# Patient Record
Sex: FEMALE | Race: WHITE | NOT HISPANIC OR LATINO
[De-identification: names, ages, dates, MRNs, and addresses within clinical notes are randomized per-mention and may not be internally consistent; named-entity substitution may affect disease eponyms.]

---

## 2017-06-13 ENCOUNTER — APPOINTMENT (OUTPATIENT)
Dept: MAMMOGRAPHY | Facility: HOSPITAL | Age: 72
End: 2017-06-13

## 2017-06-13 ENCOUNTER — OUTPATIENT (OUTPATIENT)
Dept: OUTPATIENT SERVICES | Facility: HOSPITAL | Age: 72
LOS: 1 days | End: 2017-06-13
Payer: MEDICARE

## 2017-06-13 DIAGNOSIS — Z98.89 OTHER SPECIFIED POSTPROCEDURAL STATES: Chronic | ICD-10-CM

## 2017-06-13 DIAGNOSIS — Z90.710 ACQUIRED ABSENCE OF BOTH CERVIX AND UTERUS: Chronic | ICD-10-CM

## 2017-06-13 PROCEDURE — 77063 BREAST TOMOSYNTHESIS BI: CPT

## 2017-06-13 PROCEDURE — G0202: CPT | Mod: 26

## 2017-06-13 PROCEDURE — 77067 SCR MAMMO BI INCL CAD: CPT

## 2017-06-13 PROCEDURE — 77063 BREAST TOMOSYNTHESIS BI: CPT | Mod: 26

## 2017-07-27 ENCOUNTER — EMERGENCY (EMERGENCY)
Facility: HOSPITAL | Age: 72
LOS: 1 days | End: 2017-07-27
Attending: EMERGENCY MEDICINE | Admitting: EMERGENCY MEDICINE
Payer: MEDICARE

## 2017-07-27 VITALS
SYSTOLIC BLOOD PRESSURE: 112 MMHG | HEART RATE: 75 BPM | RESPIRATION RATE: 16 BRPM | DIASTOLIC BLOOD PRESSURE: 68 MMHG | TEMPERATURE: 98 F | OXYGEN SATURATION: 98 %

## 2017-07-27 VITALS
RESPIRATION RATE: 20 BRPM | DIASTOLIC BLOOD PRESSURE: 62 MMHG | HEART RATE: 71 BPM | WEIGHT: 179.9 LBS | TEMPERATURE: 98 F | HEIGHT: 67 IN | SYSTOLIC BLOOD PRESSURE: 118 MMHG | OXYGEN SATURATION: 100 %

## 2017-07-27 DIAGNOSIS — Z98.89 OTHER SPECIFIED POSTPROCEDURAL STATES: Chronic | ICD-10-CM

## 2017-07-27 DIAGNOSIS — Z90.710 ACQUIRED ABSENCE OF BOTH CERVIX AND UTERUS: Chronic | ICD-10-CM

## 2017-07-27 PROCEDURE — 73502 X-RAY EXAM HIP UNI 2-3 VIEWS: CPT | Mod: 26,RT

## 2017-07-27 PROCEDURE — 99284 EMERGENCY DEPT VISIT MOD MDM: CPT

## 2017-07-27 PROCEDURE — 70450 CT HEAD/BRAIN W/O DYE: CPT

## 2017-07-27 PROCEDURE — 73502 X-RAY EXAM HIP UNI 2-3 VIEWS: CPT

## 2017-07-27 PROCEDURE — 70450 CT HEAD/BRAIN W/O DYE: CPT | Mod: 26

## 2017-07-27 PROCEDURE — 73562 X-RAY EXAM OF KNEE 3: CPT | Mod: 26,LT

## 2017-07-27 PROCEDURE — 72170 X-RAY EXAM OF PELVIS: CPT | Mod: 26,59

## 2017-07-27 PROCEDURE — 72170 X-RAY EXAM OF PELVIS: CPT

## 2017-07-27 PROCEDURE — 99284 EMERGENCY DEPT VISIT MOD MDM: CPT | Mod: 25

## 2017-07-27 PROCEDURE — 73562 X-RAY EXAM OF KNEE 3: CPT

## 2017-07-27 NOTE — ED ADULT NURSE NOTE - OBJECTIVE STATEMENT
71YO female BIBA, pt c/o right hip pain secondary to a slip and fall. "I was trying to take a shower when I fell". pt denies and any neck/back pain.

## 2017-07-27 NOTE — ED PROVIDER NOTE - CONSTITUTIONAL, MLM
normal... Well appearing, well nourished, awake, alert, oriented to person, place, time/situation and in no apparent distress. Appears tired

## 2017-07-27 NOTE — ED PROVIDER NOTE - OBJECTIVE STATEMENT
Patient reportedly fell getting into her shower. C/o some right hip pain. Denies other injury. No head pain, no neck pain.    Patient admits to drinking alcohol tonight

## 2017-07-27 NOTE — ED PROVIDER NOTE - PMH
Basal cell cancer  skin  Diabetes mellitus    Elevated cholesterol    Hypertension    OA (osteoarthritis)    Scoliosis

## 2017-07-27 NOTE — ED PROVIDER NOTE - CHPI ED SYMPTOMS NEG
no loss of consciousness/no weakness/no fever/no vomiting/no confusion/no abrasion/no deformity/no bleeding

## 2017-08-28 ENCOUNTER — INPATIENT (INPATIENT)
Facility: HOSPITAL | Age: 72
LOS: 9 days | Discharge: HOME | DRG: 872 | End: 2017-09-07
Attending: FAMILY MEDICINE | Admitting: FAMILY MEDICINE
Payer: MEDICARE

## 2017-08-28 VITALS
OXYGEN SATURATION: 98 % | HEART RATE: 130 BPM | SYSTOLIC BLOOD PRESSURE: 185 MMHG | TEMPERATURE: 98 F | DIASTOLIC BLOOD PRESSURE: 98 MMHG | RESPIRATION RATE: 18 BRPM

## 2017-08-28 DIAGNOSIS — L03.90 CELLULITIS, UNSPECIFIED: ICD-10-CM

## 2017-08-28 DIAGNOSIS — R00.0 TACHYCARDIA, UNSPECIFIED: ICD-10-CM

## 2017-08-28 DIAGNOSIS — Z90.710 ACQUIRED ABSENCE OF BOTH CERVIX AND UTERUS: Chronic | ICD-10-CM

## 2017-08-28 DIAGNOSIS — I10 ESSENTIAL (PRIMARY) HYPERTENSION: ICD-10-CM

## 2017-08-28 DIAGNOSIS — E78.00 PURE HYPERCHOLESTEROLEMIA, UNSPECIFIED: ICD-10-CM

## 2017-08-28 DIAGNOSIS — M54.9 DORSALGIA, UNSPECIFIED: ICD-10-CM

## 2017-08-28 DIAGNOSIS — Z98.89 OTHER SPECIFIED POSTPROCEDURAL STATES: Chronic | ICD-10-CM

## 2017-08-28 DIAGNOSIS — E11.9 TYPE 2 DIABETES MELLITUS WITHOUT COMPLICATIONS: ICD-10-CM

## 2017-08-28 LAB
ALBUMIN SERPL ELPH-MCNC: 3 G/DL — LOW (ref 3.3–5)
ALP SERPL-CCNC: 83 U/L — SIGNIFICANT CHANGE UP (ref 40–120)
ALT FLD-CCNC: 40 U/L — SIGNIFICANT CHANGE UP (ref 12–78)
ANION GAP SERPL CALC-SCNC: 10 MMOL/L — SIGNIFICANT CHANGE UP (ref 5–17)
APPEARANCE UR: CLEAR — SIGNIFICANT CHANGE UP
AST SERPL-CCNC: 21 U/L — SIGNIFICANT CHANGE UP (ref 15–37)
BASOPHILS # BLD AUTO: 0 K/UL — SIGNIFICANT CHANGE UP (ref 0–0.2)
BASOPHILS NFR BLD AUTO: 0.2 % — SIGNIFICANT CHANGE UP (ref 0–2)
BILIRUB SERPL-MCNC: 0.4 MG/DL — SIGNIFICANT CHANGE UP (ref 0.2–1.2)
BILIRUB UR-MCNC: NEGATIVE — SIGNIFICANT CHANGE UP
BUN SERPL-MCNC: 22 MG/DL — SIGNIFICANT CHANGE UP (ref 7–23)
CALCIUM SERPL-MCNC: 9.5 MG/DL — SIGNIFICANT CHANGE UP (ref 8.5–10.1)
CHLORIDE SERPL-SCNC: 97 MMOL/L — SIGNIFICANT CHANGE UP (ref 96–108)
CO2 SERPL-SCNC: 25 MMOL/L — SIGNIFICANT CHANGE UP (ref 22–31)
COLOR SPEC: YELLOW — SIGNIFICANT CHANGE UP
CREAT SERPL-MCNC: 0.73 MG/DL — SIGNIFICANT CHANGE UP (ref 0.5–1.3)
DIFF PNL FLD: NEGATIVE — SIGNIFICANT CHANGE UP
EOSINOPHIL # BLD AUTO: 0 K/UL — SIGNIFICANT CHANGE UP (ref 0–0.5)
EOSINOPHIL NFR BLD AUTO: 0 % — SIGNIFICANT CHANGE UP (ref 0–6)
GLUCOSE SERPL-MCNC: 145 MG/DL — HIGH (ref 70–99)
GLUCOSE UR QL: NEGATIVE — SIGNIFICANT CHANGE UP
HCT VFR BLD CALC: 35.4 % — SIGNIFICANT CHANGE UP (ref 34.5–45)
HGB BLD-MCNC: 12.1 G/DL — SIGNIFICANT CHANGE UP (ref 11.5–15.5)
KETONES UR-MCNC: NEGATIVE — SIGNIFICANT CHANGE UP
LACTATE SERPL-SCNC: 1.4 MMOL/L — SIGNIFICANT CHANGE UP (ref 0.7–2)
LEUKOCYTE ESTERASE UR-ACNC: NEGATIVE — SIGNIFICANT CHANGE UP
LYMPHOCYTES # BLD AUTO: 0.8 K/UL — LOW (ref 1–3.3)
LYMPHOCYTES # BLD AUTO: 4.3 % — LOW (ref 13–44)
MCHC RBC-ENTMCNC: 30.8 PG — SIGNIFICANT CHANGE UP (ref 27–34)
MCHC RBC-ENTMCNC: 34.2 GM/DL — SIGNIFICANT CHANGE UP (ref 32–36)
MCV RBC AUTO: 90.1 FL — SIGNIFICANT CHANGE UP (ref 80–100)
MONOCYTES # BLD AUTO: 1.5 K/UL — HIGH (ref 0–0.9)
MONOCYTES NFR BLD AUTO: 7.7 % — SIGNIFICANT CHANGE UP (ref 1–9)
NEUTROPHILS # BLD AUTO: 16.9 K/UL — HIGH (ref 1.8–7.4)
NEUTROPHILS NFR BLD AUTO: 87.8 % — HIGH (ref 43–77)
NITRITE UR-MCNC: NEGATIVE — SIGNIFICANT CHANGE UP
PH UR: 5 — SIGNIFICANT CHANGE UP (ref 5–8)
PLATELET # BLD AUTO: 400 K/UL — SIGNIFICANT CHANGE UP (ref 150–400)
POTASSIUM SERPL-MCNC: 4 MMOL/L — SIGNIFICANT CHANGE UP (ref 3.5–5.3)
POTASSIUM SERPL-SCNC: 4 MMOL/L — SIGNIFICANT CHANGE UP (ref 3.5–5.3)
PROT SERPL-MCNC: 7.7 G/DL — SIGNIFICANT CHANGE UP (ref 6–8.3)
PROT UR-MCNC: NEGATIVE — SIGNIFICANT CHANGE UP
RBC # BLD: 3.93 M/UL — SIGNIFICANT CHANGE UP (ref 3.8–5.2)
RBC # FLD: 12.5 % — SIGNIFICANT CHANGE UP (ref 10.3–14.5)
SODIUM SERPL-SCNC: 132 MMOL/L — LOW (ref 135–145)
SP GR SPEC: 1.01 — SIGNIFICANT CHANGE UP (ref 1.01–1.02)
UROBILINOGEN FLD QL: NEGATIVE — SIGNIFICANT CHANGE UP
WBC # BLD: 19.3 K/UL — HIGH (ref 3.8–10.5)
WBC # FLD AUTO: 19.3 K/UL — HIGH (ref 3.8–10.5)

## 2017-08-28 PROCEDURE — 71010: CPT | Mod: 26

## 2017-08-28 PROCEDURE — 93010 ELECTROCARDIOGRAM REPORT: CPT

## 2017-08-28 PROCEDURE — 72100 X-RAY EXAM L-S SPINE 2/3 VWS: CPT | Mod: 26

## 2017-08-28 PROCEDURE — 99285 EMERGENCY DEPT VISIT HI MDM: CPT

## 2017-08-28 RX ORDER — MORPHINE SULFATE 50 MG/1
4 CAPSULE, EXTENDED RELEASE ORAL ONCE
Qty: 0 | Refills: 0 | Status: DISCONTINUED | OUTPATIENT
Start: 2017-08-28 | End: 2017-08-28

## 2017-08-28 RX ORDER — SODIUM CHLORIDE 9 MG/ML
3 INJECTION INTRAMUSCULAR; INTRAVENOUS; SUBCUTANEOUS ONCE
Qty: 0 | Refills: 0 | Status: COMPLETED | OUTPATIENT
Start: 2017-08-28 | End: 2017-08-28

## 2017-08-28 RX ORDER — SIMVASTATIN 20 MG/1
20 TABLET, FILM COATED ORAL AT BEDTIME
Qty: 0 | Refills: 0 | Status: DISCONTINUED | OUTPATIENT
Start: 2017-08-28 | End: 2017-09-07

## 2017-08-28 RX ORDER — LISINOPRIL 2.5 MG/1
20 TABLET ORAL DAILY
Qty: 0 | Refills: 0 | Status: DISCONTINUED | OUTPATIENT
Start: 2017-08-28 | End: 2017-09-07

## 2017-08-28 RX ORDER — ONDANSETRON 8 MG/1
4 TABLET, FILM COATED ORAL EVERY 6 HOURS
Qty: 0 | Refills: 0 | Status: DISCONTINUED | OUTPATIENT
Start: 2017-08-28 | End: 2017-09-07

## 2017-08-28 RX ORDER — PIPERACILLIN AND TAZOBACTAM 4; .5 G/20ML; G/20ML
3.38 INJECTION, POWDER, LYOPHILIZED, FOR SOLUTION INTRAVENOUS ONCE
Qty: 0 | Refills: 0 | Status: COMPLETED | OUTPATIENT
Start: 2017-08-28 | End: 2017-08-28

## 2017-08-28 RX ORDER — DEXTROSE 50 % IN WATER 50 %
1 SYRINGE (ML) INTRAVENOUS ONCE
Qty: 0 | Refills: 0 | Status: DISCONTINUED | OUTPATIENT
Start: 2017-08-28 | End: 2017-09-05

## 2017-08-28 RX ORDER — SODIUM CHLORIDE 9 MG/ML
500 INJECTION INTRAMUSCULAR; INTRAVENOUS; SUBCUTANEOUS
Qty: 0 | Refills: 0 | Status: COMPLETED | OUTPATIENT
Start: 2017-08-28 | End: 2017-08-28

## 2017-08-28 RX ORDER — GLUCAGON INJECTION, SOLUTION 0.5 MG/.1ML
1 INJECTION, SOLUTION SUBCUTANEOUS ONCE
Qty: 0 | Refills: 0 | Status: DISCONTINUED | OUTPATIENT
Start: 2017-08-28 | End: 2017-09-05

## 2017-08-28 RX ORDER — SODIUM CHLORIDE 9 MG/ML
1000 INJECTION INTRAMUSCULAR; INTRAVENOUS; SUBCUTANEOUS
Qty: 0 | Refills: 0 | Status: DISCONTINUED | OUTPATIENT
Start: 2017-08-28 | End: 2017-08-30

## 2017-08-28 RX ORDER — ACETAMINOPHEN 500 MG
650 TABLET ORAL ONCE
Qty: 0 | Refills: 0 | Status: COMPLETED | OUTPATIENT
Start: 2017-08-28 | End: 2017-08-28

## 2017-08-28 RX ORDER — INSULIN LISPRO 100/ML
VIAL (ML) SUBCUTANEOUS
Qty: 0 | Refills: 0 | Status: DISCONTINUED | OUTPATIENT
Start: 2017-08-28 | End: 2017-09-04

## 2017-08-28 RX ORDER — IBUPROFEN 200 MG
600 TABLET ORAL ONCE
Qty: 0 | Refills: 0 | Status: COMPLETED | OUTPATIENT
Start: 2017-08-28 | End: 2017-08-28

## 2017-08-28 RX ORDER — SODIUM CHLORIDE 9 MG/ML
1000 INJECTION, SOLUTION INTRAVENOUS
Qty: 0 | Refills: 0 | Status: DISCONTINUED | OUTPATIENT
Start: 2017-08-28 | End: 2017-09-05

## 2017-08-28 RX ORDER — ASPIRIN/CALCIUM CARB/MAGNESIUM 324 MG
81 TABLET ORAL DAILY
Qty: 0 | Refills: 0 | Status: DISCONTINUED | OUTPATIENT
Start: 2017-08-28 | End: 2017-09-02

## 2017-08-28 RX ORDER — ACETAMINOPHEN 500 MG
650 TABLET ORAL EVERY 6 HOURS
Qty: 0 | Refills: 0 | Status: DISCONTINUED | OUTPATIENT
Start: 2017-08-28 | End: 2017-08-29

## 2017-08-28 RX ORDER — VANCOMYCIN HCL 1 G
1000 VIAL (EA) INTRAVENOUS EVERY 12 HOURS
Qty: 0 | Refills: 0 | Status: DISCONTINUED | OUTPATIENT
Start: 2017-08-28 | End: 2017-08-29

## 2017-08-28 RX ORDER — DEXTROSE 50 % IN WATER 50 %
12.5 SYRINGE (ML) INTRAVENOUS ONCE
Qty: 0 | Refills: 0 | Status: DISCONTINUED | OUTPATIENT
Start: 2017-08-28 | End: 2017-09-05

## 2017-08-28 RX ORDER — DEXTROSE 50 % IN WATER 50 %
25 SYRINGE (ML) INTRAVENOUS ONCE
Qty: 0 | Refills: 0 | Status: DISCONTINUED | OUTPATIENT
Start: 2017-08-28 | End: 2017-09-05

## 2017-08-28 RX ORDER — VANCOMYCIN HCL 1 G
1000 VIAL (EA) INTRAVENOUS ONCE
Qty: 0 | Refills: 0 | Status: COMPLETED | OUTPATIENT
Start: 2017-08-28 | End: 2017-08-28

## 2017-08-28 RX ORDER — ENOXAPARIN SODIUM 100 MG/ML
40 INJECTION SUBCUTANEOUS DAILY
Qty: 0 | Refills: 0 | Status: DISCONTINUED | OUTPATIENT
Start: 2017-08-28 | End: 2017-09-04

## 2017-08-28 RX ORDER — ONDANSETRON 8 MG/1
4 TABLET, FILM COATED ORAL ONCE
Qty: 0 | Refills: 0 | Status: COMPLETED | OUTPATIENT
Start: 2017-08-28 | End: 2017-08-28

## 2017-08-28 RX ORDER — OXYCODONE AND ACETAMINOPHEN 5; 325 MG/1; MG/1
1 TABLET ORAL EVERY 4 HOURS
Qty: 0 | Refills: 0 | Status: DISCONTINUED | OUTPATIENT
Start: 2017-08-28 | End: 2017-08-29

## 2017-08-28 RX ADMIN — SODIUM CHLORIDE 2000 MILLILITER(S): 9 INJECTION INTRAMUSCULAR; INTRAVENOUS; SUBCUTANEOUS at 17:00

## 2017-08-28 RX ADMIN — SODIUM CHLORIDE 3 MILLILITER(S): 9 INJECTION INTRAMUSCULAR; INTRAVENOUS; SUBCUTANEOUS at 17:37

## 2017-08-28 RX ADMIN — LISINOPRIL 20 MILLIGRAM(S): 2.5 TABLET ORAL at 21:14

## 2017-08-28 RX ADMIN — MORPHINE SULFATE 4 MILLIGRAM(S): 50 CAPSULE, EXTENDED RELEASE ORAL at 16:45

## 2017-08-28 RX ADMIN — SIMVASTATIN 20 MILLIGRAM(S): 20 TABLET, FILM COATED ORAL at 21:14

## 2017-08-28 RX ADMIN — SODIUM CHLORIDE 2000 MILLILITER(S): 9 INJECTION INTRAMUSCULAR; INTRAVENOUS; SUBCUTANEOUS at 17:13

## 2017-08-28 RX ADMIN — PIPERACILLIN AND TAZOBACTAM 200 GRAM(S): 4; .5 INJECTION, POWDER, LYOPHILIZED, FOR SOLUTION INTRAVENOUS at 17:00

## 2017-08-28 RX ADMIN — SODIUM CHLORIDE 2000 MILLILITER(S): 9 INJECTION INTRAMUSCULAR; INTRAVENOUS; SUBCUTANEOUS at 17:35

## 2017-08-28 RX ADMIN — Medication 250 MILLIGRAM(S): at 17:25

## 2017-08-28 RX ADMIN — SODIUM CHLORIDE 75 MILLILITER(S): 9 INJECTION INTRAMUSCULAR; INTRAVENOUS; SUBCUTANEOUS at 21:15

## 2017-08-28 RX ADMIN — SODIUM CHLORIDE 2000 MILLILITER(S): 9 INJECTION INTRAMUSCULAR; INTRAVENOUS; SUBCUTANEOUS at 16:45

## 2017-08-28 RX ADMIN — SODIUM CHLORIDE 2000 MILLILITER(S): 9 INJECTION INTRAMUSCULAR; INTRAVENOUS; SUBCUTANEOUS at 17:15

## 2017-08-28 RX ADMIN — ONDANSETRON 4 MILLIGRAM(S): 8 TABLET, FILM COATED ORAL at 16:45

## 2017-08-28 RX ADMIN — OXYCODONE AND ACETAMINOPHEN 1 TABLET(S): 5; 325 TABLET ORAL at 21:14

## 2017-08-28 RX ADMIN — SODIUM CHLORIDE 2000 MILLILITER(S): 9 INJECTION INTRAMUSCULAR; INTRAVENOUS; SUBCUTANEOUS at 17:54

## 2017-08-28 RX ADMIN — OXYCODONE AND ACETAMINOPHEN 1 TABLET(S): 5; 325 TABLET ORAL at 22:00

## 2017-08-28 NOTE — H&P ADULT - PROBLEM SELECTOR PLAN 1
cellulitis of back  check cultures  ivf  iv zosyn and iv vanco  if no improvement will need id eval  monitor and trend lactate  trend labs and temp

## 2017-08-28 NOTE — ED ADULT NURSE NOTE - OBJECTIVE STATEMENT
Present to ER with c/o of back pain. Pt states pian started on saturday and it got worst today. Pt arrived in ER with HR in 120s and elevated BP. Pt also states she takes pain meds but there were no relief today. Present to ER with c/o of back pain. Pt states pian started on saturday and it got worst today. Pt was sent by PMD and arrived in ER with HR in 130s and elevated BP. Pt also states she takes pain meds but there were no relief today. Pt c/o of headache. denies chest pain, SOB. no nausea, vomiting or diarrhea.

## 2017-08-28 NOTE — ED PROVIDER NOTE - CHPI ED SYMPTOMS NEG
no tingling/no constipation/no anorexia/no numbness/no bowel dysfunction/no motor function loss/no bladder dysfunction

## 2017-08-28 NOTE — H&P ADULT - HISTORY OF PRESENT ILLNESS
pt presents with worsening back pain that started in lower back and now going up to the shoulders, also complains of back feeling very hot. no recent sun exposure, back has always been covered with shirt, no fever or chills but has been taking tylenol for pain. seen by her endocrinologist today for dm management and advised to come to er as her bp was elevated and she was tachycardic.

## 2017-08-28 NOTE — ED PROVIDER NOTE - OBJECTIVE STATEMENT
entire back pain.  pmd- [facility]acher.  no prior episode.  pt took Tylenol and  muscle relaxant. no other complaint.

## 2017-08-29 DIAGNOSIS — R78.81 BACTEREMIA: ICD-10-CM

## 2017-08-29 LAB
-  CANDIDA ALBICANS: SIGNIFICANT CHANGE UP
-  CANDIDA GLABRATA: SIGNIFICANT CHANGE UP
-  CANDIDA KRUSEI: SIGNIFICANT CHANGE UP
-  CANDIDA PARAPSILOSIS: SIGNIFICANT CHANGE UP
-  CANDIDA TROPICALIS: SIGNIFICANT CHANGE UP
-  COAGULASE NEGATIVE STAPHYLOCOCCUS: SIGNIFICANT CHANGE UP
-  K. PNEUMONIAE GROUP: SIGNIFICANT CHANGE UP
-  KPC RESISTANCE GENE: SIGNIFICANT CHANGE UP
-  STREPTOCOCCUS SP. (NOT GRP A, B OR S PNEUMONIAE): SIGNIFICANT CHANGE UP
A BAUMANNII DNA SPEC QL NAA+PROBE: SIGNIFICANT CHANGE UP
ANION GAP SERPL CALC-SCNC: 8 MMOL/L — SIGNIFICANT CHANGE UP (ref 5–17)
BUN SERPL-MCNC: 17 MG/DL — SIGNIFICANT CHANGE UP (ref 7–23)
CALCIUM SERPL-MCNC: 8.5 MG/DL — SIGNIFICANT CHANGE UP (ref 8.5–10.1)
CHLORIDE SERPL-SCNC: 100 MMOL/L — SIGNIFICANT CHANGE UP (ref 96–108)
CO2 SERPL-SCNC: 27 MMOL/L — SIGNIFICANT CHANGE UP (ref 22–31)
CREAT SERPL-MCNC: 0.7 MG/DL — SIGNIFICANT CHANGE UP (ref 0.5–1.3)
CULTURE RESULTS: SIGNIFICANT CHANGE UP
E CLOAC COMP DNA BLD POS QL NAA+PROBE: SIGNIFICANT CHANGE UP
E COLI DNA BLD POS QL NAA+NON-PROBE: SIGNIFICANT CHANGE UP
ENTEROCOC DNA BLD POS QL NAA+NON-PROBE: SIGNIFICANT CHANGE UP
ENTEROCOC DNA BLD POS QL NAA+NON-PROBE: SIGNIFICANT CHANGE UP
GLUCOSE SERPL-MCNC: 145 MG/DL — HIGH (ref 70–99)
GP B STREP DNA BLD POS QL NAA+NON-PROBE: SIGNIFICANT CHANGE UP
GRAM STN FLD: SIGNIFICANT CHANGE UP
HAEM INFLU DNA BLD POS QL NAA+NON-PROBE: SIGNIFICANT CHANGE UP
HBA1C BLD-MCNC: 7.4 % — HIGH (ref 4–5.6)
HCT VFR BLD CALC: 32.7 % — LOW (ref 34.5–45)
HGB BLD-MCNC: 10.8 G/DL — LOW (ref 11.5–15.5)
K OXYTOCA DNA BLD POS QL NAA+NON-PROBE: SIGNIFICANT CHANGE UP
L MONOCYTOG DNA BLD POS QL NAA+NON-PROBE: SIGNIFICANT CHANGE UP
MCHC RBC-ENTMCNC: 29.9 PG — SIGNIFICANT CHANGE UP (ref 27–34)
MCHC RBC-ENTMCNC: 33.2 GM/DL — SIGNIFICANT CHANGE UP (ref 32–36)
MCV RBC AUTO: 90.1 FL — SIGNIFICANT CHANGE UP (ref 80–100)
METHOD TYPE: SIGNIFICANT CHANGE UP
MRSA SPEC QL CULT: SIGNIFICANT CHANGE UP
MSSA DNA SPEC QL NAA+PROBE: SIGNIFICANT CHANGE UP
N MEN ISLT CULT: SIGNIFICANT CHANGE UP
P AERUGINOSA DNA BLD POS NAA+NON-PROBE: SIGNIFICANT CHANGE UP
PLATELET # BLD AUTO: 349 K/UL — SIGNIFICANT CHANGE UP (ref 150–400)
POTASSIUM SERPL-MCNC: 4.4 MMOL/L — SIGNIFICANT CHANGE UP (ref 3.5–5.3)
POTASSIUM SERPL-SCNC: 4.4 MMOL/L — SIGNIFICANT CHANGE UP (ref 3.5–5.3)
PROTEUS SP DNA BLD POS QL NAA+NON-PROBE: SIGNIFICANT CHANGE UP
RBC # BLD: 3.63 M/UL — LOW (ref 3.8–5.2)
RBC # FLD: 12.9 % — SIGNIFICANT CHANGE UP (ref 10.3–14.5)
S MARCESCENS DNA BLD POS NAA+NON-PROBE: SIGNIFICANT CHANGE UP
S PNEUM DNA BLD POS QL NAA+NON-PROBE: SIGNIFICANT CHANGE UP
S PYO DNA BLD POS QL NAA+NON-PROBE: SIGNIFICANT CHANGE UP
SODIUM SERPL-SCNC: 135 MMOL/L — SIGNIFICANT CHANGE UP (ref 135–145)
SPECIMEN SOURCE: SIGNIFICANT CHANGE UP
WBC # BLD: 15.4 K/UL — HIGH (ref 3.8–10.5)
WBC # FLD AUTO: 15.4 K/UL — HIGH (ref 3.8–10.5)

## 2017-08-29 RX ORDER — LIDOCAINE 4 G/100G
1 CREAM TOPICAL DAILY
Qty: 0 | Refills: 0 | Status: DISCONTINUED | OUTPATIENT
Start: 2017-08-29 | End: 2017-09-07

## 2017-08-29 RX ORDER — MORPHINE SULFATE 50 MG/1
2 CAPSULE, EXTENDED RELEASE ORAL EVERY 6 HOURS
Qty: 0 | Refills: 0 | Status: DISCONTINUED | OUTPATIENT
Start: 2017-08-29 | End: 2017-08-29

## 2017-08-29 RX ORDER — OXYCODONE HYDROCHLORIDE 5 MG/1
5 TABLET ORAL
Qty: 0 | Refills: 0 | Status: DISCONTINUED | OUTPATIENT
Start: 2017-08-29 | End: 2017-08-29

## 2017-08-29 RX ORDER — ACETAMINOPHEN 500 MG
650 TABLET ORAL EVERY 6 HOURS
Qty: 0 | Refills: 0 | Status: DISCONTINUED | OUTPATIENT
Start: 2017-08-29 | End: 2017-09-07

## 2017-08-29 RX ORDER — CEFAZOLIN SODIUM 1 G
2000 VIAL (EA) INJECTION ONCE
Qty: 0 | Refills: 0 | Status: COMPLETED | OUTPATIENT
Start: 2017-08-29 | End: 2017-08-29

## 2017-08-29 RX ORDER — TRAMADOL HYDROCHLORIDE 50 MG/1
25 TABLET ORAL
Qty: 0 | Refills: 0 | Status: DISCONTINUED | OUTPATIENT
Start: 2017-08-29 | End: 2017-08-29

## 2017-08-29 RX ORDER — CEFAZOLIN SODIUM 1 G
2000 VIAL (EA) INJECTION EVERY 8 HOURS
Qty: 0 | Refills: 0 | Status: DISCONTINUED | OUTPATIENT
Start: 2017-08-29 | End: 2017-09-07

## 2017-08-29 RX ORDER — OXYCODONE HYDROCHLORIDE 5 MG/1
5 TABLET ORAL
Qty: 0 | Refills: 0 | Status: DISCONTINUED | OUTPATIENT
Start: 2017-08-29 | End: 2017-09-02

## 2017-08-29 RX ORDER — GABAPENTIN 400 MG/1
100 CAPSULE ORAL THREE TIMES A DAY
Qty: 0 | Refills: 0 | Status: DISCONTINUED | OUTPATIENT
Start: 2017-08-29 | End: 2017-09-07

## 2017-08-29 RX ORDER — CEFAZOLIN SODIUM 1 G
VIAL (EA) INJECTION
Qty: 0 | Refills: 0 | Status: DISCONTINUED | OUTPATIENT
Start: 2017-08-29 | End: 2017-09-07

## 2017-08-29 RX ADMIN — Medication 2: at 12:25

## 2017-08-29 RX ADMIN — LISINOPRIL 20 MILLIGRAM(S): 2.5 TABLET ORAL at 06:42

## 2017-08-29 RX ADMIN — Medication 650 MILLIGRAM(S): at 06:47

## 2017-08-29 RX ADMIN — OXYCODONE AND ACETAMINOPHEN 1 TABLET(S): 5; 325 TABLET ORAL at 01:39

## 2017-08-29 RX ADMIN — ENOXAPARIN SODIUM 40 MILLIGRAM(S): 100 INJECTION SUBCUTANEOUS at 11:10

## 2017-08-29 RX ADMIN — Medication 650 MILLIGRAM(S): at 12:17

## 2017-08-29 RX ADMIN — Medication 81 MILLIGRAM(S): at 11:10

## 2017-08-29 RX ADMIN — OXYCODONE AND ACETAMINOPHEN 1 TABLET(S): 5; 325 TABLET ORAL at 02:00

## 2017-08-29 RX ADMIN — GABAPENTIN 100 MILLIGRAM(S): 400 CAPSULE ORAL at 22:25

## 2017-08-29 RX ADMIN — Medication 650 MILLIGRAM(S): at 07:20

## 2017-08-29 RX ADMIN — Medication 250 MILLIGRAM(S): at 06:42

## 2017-08-29 RX ADMIN — SIMVASTATIN 20 MILLIGRAM(S): 20 TABLET, FILM COATED ORAL at 22:25

## 2017-08-29 RX ADMIN — Medication 650 MILLIGRAM(S): at 20:27

## 2017-08-29 RX ADMIN — Medication 100 MILLIGRAM(S): at 12:12

## 2017-08-29 RX ADMIN — Medication 100 MILLIGRAM(S): at 22:25

## 2017-08-29 RX ADMIN — GABAPENTIN 100 MILLIGRAM(S): 400 CAPSULE ORAL at 13:19

## 2017-08-29 RX ADMIN — Medication 2: at 08:16

## 2017-08-29 RX ADMIN — Medication 2: at 17:19

## 2017-08-29 NOTE — PROVIDER CONTACT NOTE (CRITICAL VALUE NOTIFICATION) - ACTION/TREATMENT ORDERED:
Dr. Miller returned call, repeat cultures ordered, he will call in I&D Dr. Miller returned call, repeat cultures ordered, he will call in I&D, prn tylenol for temps

## 2017-08-29 NOTE — CONSULT NOTE ADULT - SUBJECTIVE AND OBJECTIVE BOX
HPI:  pt presents with worsening back pain that started in lower back and now going up to the shoulders, also complains of back feeling very hot. no recent sun exposure, back has always been covered with shirt, no fever or chills but has been taking tylenol for pain. seen by her endocrinologist today for dm management and advised to come to er as her bp was elevated and she was tachycardic. (28 Aug 2017 17:09) Patient reports severe right neck pain currently      PAST MEDICAL & SURGICAL HISTORY:  Scoliosis  Basal cell cancer: skin  OA (osteoarthritis)  Hypertension  Elevated cholesterol  Diabetes mellitus  H/O arthroscopic knee surgery: left meniscus-- 25 years ago  H/O: hysterectomy      Antimicrobials  ceFAZolin   IVPB   IV Intermittent       Immunological      Other  aspirin enteric coated 81 milliGRAM(s) Oral daily  simvastatin 20 milliGRAM(s) Oral at bedtime  lisinopril 20 milliGRAM(s) Oral daily  enoxaparin Injectable 40 milliGRAM(s) SubCutaneous daily  insulin lispro (HumaLOG) corrective regimen sliding scale   SubCutaneous three times a day before meals  dextrose 5%. 1000 milliLiter(s) IV Continuous <Continuous>  dextrose Gel 1 Dose(s) Oral once PRN  dextrose 50% Injectable 12.5 Gram(s) IV Push once  dextrose 50% Injectable 25 Gram(s) IV Push once  dextrose 50% Injectable 25 Gram(s) IV Push once  glucagon  Injectable 1 milliGRAM(s) IntraMuscular once PRN  acetaminophen   Tablet. 650 milliGRAM(s) Oral every 6 hours PRN  ondansetron Injectable 4 milliGRAM(s) IV Push every 6 hours PRN  sodium chloride 0.9%. 1000 milliLiter(s) IV Continuous <Continuous>  oxyCODONE    5 mG/acetaminophen 325 mG 1 Tablet(s) Oral every 4 hours PRN  acetaminophen   Tablet 650 milliGRAM(s) Oral every 6 hours PRN      Allergies    No Known Allergies    Intolerances        SOCIAL HISTORY: no current tobacco    FAMILY HISTORY: neg      ROS:    EYES:  Negative  blurry vision or double vision  GASTROINTESTINAL:  Negative for nausea, vomiting, diarrhea  -otherwise negative except for subjective    Vital Signs Last 24 Hrs  T(C): 37.8 (29 Aug 2017 05:42), Max: 38.3 (28 Aug 2017 20:26)  T(F): 100.1 (29 Aug 2017 05:42), Max: 100.9 (28 Aug 2017 20:26)  HR: 98 (29 Aug 2017 05:42) (98 - 130)  BP: 132/73 (29 Aug 2017 05:42) (132/73 - 185/98)  BP(mean): --  RR: 18 (29 Aug 2017 05:42) (18 - 18)  SpO2: 94% (29 Aug 2017 05:42) (91% - 100%)    PE:  WDWN in no distress  HEENT:  NC, PERRL, sclerae anicteric, conjunctivae clear, EOMI.  Sinuses nontender, no nasal exudate.  No buccal or pharyngeal lesions, erythema or exudate  Neck:  Supple, no adenopathy, noted swollen tender area right side of neck  Lungs:  No accessory muscle use, bilaterally clear to auscultation  Cor:  RRR, S1, S2, no murmur appreciated  Abd:  Symmetric, normoactive BS.  Soft, nontender, no masses, guarding or rebound.  Liver and spleen not enlarged  Extrem:  No cyanosis or edema  Skin:  No rashes.  Neuro: grossly intact  Musc: moving all limbs freely, no focal deficits, no focal spinal tenderness    LABS:                        10.8   15.4  )-----------( 349      ( 29 Aug 2017 06:43 )             32.7         135  |  100  |  17  ----------------------------<  145<H>  4.4   |  27  |  0.70    Ca    8.5      29 Aug 2017 06:43    TPro  7.7  /  Alb  3.0<L>  /  TBili  0.4  /  DBili  x   /  AST  21  /  ALT  40  /  AlkPhos  83      Urinalysis Basic - ( 28 Aug 2017 15:58 )    Color: Yellow / Appearance: Clear / S.015 / pH: x  Gluc: x / Ketone: Negative  / Bili: Negative / Urobili: Negative   Blood: x / Protein: Negative / Nitrite: Negative   Leuk Esterase: Negative / RBC: x / WBC x   Sq Epi: x / Non Sq Epi: x / Bacteria: x        MICROBIOLOGY:    Culture - Blood (17 @ 20:14)      -  Methicillin resistant Staphylococcus aureus (MRSA): Nondet      -  Staphylococcus aureus: Detec Any isolate of Staphylococcus aureus from a blood culture is NOT considered a contaminant.      Gram Stain:   Growth in aerobic bottle: Gram Positive Cocci in Clusters    Specimen Source: .Blood Blood-Peripheral    Organism: Blood Culture PCR    Culture Results:   Growth in aerobic bottle: Gram Positive Cocci in Clusters  ***Blood Panel PCR results on this specimen are available  approximately 3 hours after the Gram stain result.***  Gram stain, PCR, and/or culture results may not always  correspond due to difference in methodologies.    Organism Identification: Blood Culture PCR    Method Type: PCR        RADIOLOGY & ADDITIONAL STUDIES:    --< from: Xray Lumbar Spine AP + Lateral (17 @ 16:08) >  EXAM:  LUMBAR SPINE AP & LAT                            PROCEDURE DATE:  2017          INTERPRETATION:  LUMBAR SPINE AP LAT    HISTORY:  low back pain    VIEWS:  AP, Lateral       COMPARISON: Lumbar spine 2013    FINDINGS:      The normal lordotic curve is preserved.  There is stable moderate   dextroscoliosis.     Alignment of the lumbar spine is unremarkable.     There is no evidence for compression deformities.     Degenerative disc disease at L4-L5 again noted.    The visualized soft tissues are unremarkable.     The sacrum and sacroiliac joints are obscured by overlying bowel gas.    IMPRESSION:    Preserved vertebral body height and alignment.  Stable moderate   dextroscoliosis and degenerative disc disease.

## 2017-08-29 NOTE — PROVIDER CONTACT NOTE (OTHER) - ACTION/TREATMENT ORDERED:
pt refusing MRI, states had one within last few weeks, awaiting call back pt refusing MRI, states had one within last few weeks, awaiting call back, returned call at [phone], aware pt refusing MRI, and results of blood cultures both bottles show gram pos cooci inpairs

## 2017-08-29 NOTE — PROGRESS NOTE ADULT - SUBJECTIVE AND OBJECTIVE BOX
neurology  cons dict.  lumbar radiculopathy,   as per patient has had recent lumbar and cervical mri.  would get report.  analgesic.

## 2017-08-29 NOTE — CONSULT NOTE ADULT - PROBLEM SELECTOR RECOMMENDATION 9
Suggested spinal/neck source. Will recommend full evaluation so  repeat blood cultures-will order  MRI of neck  TTE and consideration of TADEO based on persistance of bacteremia  Cefazolin with no evidence for MRSA    Thank you for consulting us and involving us in the management of this most interesting and challenging case.     We will follow along in the care of this patient.

## 2017-08-29 NOTE — PROVIDER CONTACT NOTE (CRITICAL VALUE NOTIFICATION) - ACTION/TREATMENT ORDERED:
was made aware in am, changes in antibiotics made my Dr. Benites was made aware in am, changes in antibiotics made my Dr. Benites, Dr. Benites aware at [phone]

## 2017-08-29 NOTE — PROGRESS NOTE ADULT - SUBJECTIVE AND OBJECTIVE BOX
Patient is a 72y old  Female who presents with a chief complaint of back pain and hottness (28 Aug 2017 17:09)      INTERVAL /OVERNIGHT EVENTS: pain better, denies chills    MEDICATIONS  (STANDING):  aspirin enteric coated 81 milliGRAM(s) Oral daily  simvastatin 20 milliGRAM(s) Oral at bedtime  lisinopril 20 milliGRAM(s) Oral daily  enoxaparin Injectable 40 milliGRAM(s) SubCutaneous daily  insulin lispro (HumaLOG) corrective regimen sliding scale   SubCutaneous three times a day before meals  dextrose 5%. 1000 milliLiter(s) (50 mL/Hr) IV Continuous <Continuous>  dextrose 50% Injectable 12.5 Gram(s) IV Push once  dextrose 50% Injectable 25 Gram(s) IV Push once  dextrose 50% Injectable 25 Gram(s) IV Push once  sodium chloride 0.9%. 1000 milliLiter(s) (50 mL/Hr) IV Continuous <Continuous>  ceFAZolin   IVPB   IV Intermittent   ceFAZolin   IVPB 2000 milliGRAM(s) IV Intermittent every 8 hours  gabapentin 100 milliGRAM(s) Oral three times a day    MEDICATIONS  (PRN):  dextrose Gel 1 Dose(s) Oral once PRN Blood Glucose LESS THAN 70 milliGRAM(s)/deciliter  glucagon  Injectable 1 milliGRAM(s) IntraMuscular once PRN Glucose LESS THAN 70 milligrams/deciliter  ondansetron Injectable 4 milliGRAM(s) IV Push every 6 hours PRN Nausea and/or Vomiting  acetaminophen   Tablet 650 milliGRAM(s) Oral every 6 hours PRN For Temp greater than 38 C (100.4 F)  traMADol 25 milliGRAM(s) Oral four times a day PRN Mild Pain (1 - 3)  oxyCODONE    IR 5 milliGRAM(s) Oral four times a day PRN Moderate Pain (4 - 6)  morphine  - Injectable 2 milliGRAM(s) IV Push every 6 hours PRN Severe Pain (7 - 10)      Allergies    No Known Allergies    Intolerances        REVIEW OF SYSTEMS:  CONSTITUTIONAL: No fever, weight loss, or fatigue  EYES: No eye pain, visual disturbances, or discharge  ENMT:  No difficulty hearing, tinnitus, vertigo; No sinus or throat pain  NECK: No pain or stiffness  RESPIRATORY: No cough, wheezing, chills or hemoptysis; No shortness of breath  CARDIOVASCULAR: No chest pain, palpitations, dizziness, or leg swelling  GASTROINTESTINAL: No abdominal or epigastric pain. No nausea, vomiting, or hematemesis; No diarrhea or constipation. No melena or hematochezia.  GENITOURINARY: No dysuria, frequency, hematuria, or incontinence  NEUROLOGICAL: No headaches, memory loss, loss of strength, numbness, or tremors  SKIN: No itching, burning, rashes, or lesions   LYMPH NODES: No enlarged glands  ENDOCRINE: No heat or cold intolerance; No hair loss; No polydipsia or polyuria  MUSCULOSKELETAL: No joint pain or swelling; No muscle, back, or extremity pain  PSYCHIATRIC: No depression, anxiety, mood swings, or difficulty sleeping  HEME/LYMPH: No easy bruising, or bleeding gums  ALLERGY AND IMMUNOLOGIC: No hives or eczema    Vital Signs Last 24 Hrs  T(C): 38.3 (29 Aug 2017 12:17), Max: 38.3 (28 Aug 2017 20:26)  T(F): 101 (29 Aug 2017 12:17), Max: 101 (29 Aug 2017 12:17)  HR: 97 (29 Aug 2017 12:17) (97 - 112)  BP: 132/68 (29 Aug 2017 12:17) (132/68 - 173/74)  BP(mean): --  RR: 18 (29 Aug 2017 12:17) (18 - 18)  SpO2: 92% (29 Aug 2017 12:17) (91% - 100%)    PHYSICAL EXAM:  GENERAL: NAD, well-groomed, well-developed  HEAD:  Atraumatic, Normocephalic  EYES: EOMI, PERRLA, conjunctiva and sclera clear  ENMT: No tonsillar erythema, exudates, or enlargement; Moist mucous membranes, Good dentition, No lesions  NECK: Supple, No JVD, Normal thyroid  NERVOUS SYSTEM:  Alert & Oriented X3, Good concentration; Motor Strength 5/5 B/L upper and lower extremities; DTRs 2+ intact and symmetric  CHEST/LUNG: Clear to auscultation bilaterally; No rales, rhonchi, wheezing, or rubs  HEART: Regular rate and rhythm; No murmurs, rubs, or gallops  ABDOMEN: Soft, Nontender, Nondistended; Bowel sounds present  EXTREMITIES:  2+ Peripheral Pulses, No clubbing, cyanosis, or edema  LYMPH: No lymphadenopathy noted  SKIN: No rashes or lesions    LABS:                        10.8   15.4  )-----------( 349      ( 29 Aug 2017 06:43 )             32.7     29 Aug 2017 06:43    135    |  100    |  17     ----------------------------<  145    4.4     |  27     |  0.70     Ca    8.5        29 Aug 2017 06:43    TPro  7.7    /  Alb  3.0    /  TBili  0.4    /  DBili  x      /  AST  21     /  ALT  40     /  AlkPhos  83     28 Aug 2017 16:43      Urinalysis Basic - ( 28 Aug 2017 15:58 )    Color: Yellow / Appearance: Clear / S.015 / pH: x  Gluc: x / Ketone: Negative  / Bili: Negative / Urobili: Negative   Blood: x / Protein: Negative / Nitrite: Negative   Leuk Esterase: Negative / RBC: x / WBC x   Sq Epi: x / Non Sq Epi: x / Bacteria: x      CAPILLARY BLOOD GLUCOSE  195 (29 Aug 2017 12:12)  199 (29 Aug 2017 07:44)          RADIOLOGY & ADDITIONAL TESTS:    Notes Reviewed:  [ x] YES  [ ] NO    Care Discussed with Consultants/Other Providers x[ x] YES  [ ] NO

## 2017-08-29 NOTE — PROGRESS NOTE ADULT - PROBLEM SELECTOR PLAN 1
cellulitis of back  check cultures  ivf  iv zosyn and iv vanco    id eval with Dr. REYES / ROSIE  monitor and trend lactate  trend labs and temp

## 2017-08-30 DIAGNOSIS — A41.9 SEPSIS, UNSPECIFIED ORGANISM: ICD-10-CM

## 2017-08-30 LAB
GRAM STN FLD: SIGNIFICANT CHANGE UP
HCT VFR BLD CALC: 30.6 % — LOW (ref 34.5–45)
HGB BLD-MCNC: 10.2 G/DL — LOW (ref 11.5–15.5)
MCHC RBC-ENTMCNC: 30.2 PG — SIGNIFICANT CHANGE UP (ref 27–34)
MCHC RBC-ENTMCNC: 33.5 GM/DL — SIGNIFICANT CHANGE UP (ref 32–36)
MCV RBC AUTO: 90.1 FL — SIGNIFICANT CHANGE UP (ref 80–100)
PLATELET # BLD AUTO: 306 K/UL — SIGNIFICANT CHANGE UP (ref 150–400)
RBC # BLD: 3.39 M/UL — LOW (ref 3.8–5.2)
RBC # FLD: 12.7 % — SIGNIFICANT CHANGE UP (ref 10.3–14.5)
SPECIMEN SOURCE: SIGNIFICANT CHANGE UP
SPECIMEN SOURCE: SIGNIFICANT CHANGE UP
WBC # BLD: 7.7 K/UL — SIGNIFICANT CHANGE UP (ref 3.8–10.5)
WBC # FLD AUTO: 7.7 K/UL — SIGNIFICANT CHANGE UP (ref 3.8–10.5)

## 2017-08-30 PROCEDURE — 72158 MRI LUMBAR SPINE W/O & W/DYE: CPT | Mod: 26

## 2017-08-30 RX ADMIN — Medication 650 MILLIGRAM(S): at 21:16

## 2017-08-30 RX ADMIN — ENOXAPARIN SODIUM 40 MILLIGRAM(S): 100 INJECTION SUBCUTANEOUS at 11:27

## 2017-08-30 RX ADMIN — Medication 650 MILLIGRAM(S): at 05:31

## 2017-08-30 RX ADMIN — OXYCODONE HYDROCHLORIDE 5 MILLIGRAM(S): 5 TABLET ORAL at 18:56

## 2017-08-30 RX ADMIN — LIDOCAINE 1 PATCH: 4 CREAM TOPICAL at 21:20

## 2017-08-30 RX ADMIN — GABAPENTIN 100 MILLIGRAM(S): 400 CAPSULE ORAL at 21:16

## 2017-08-30 RX ADMIN — LISINOPRIL 20 MILLIGRAM(S): 2.5 TABLET ORAL at 05:27

## 2017-08-30 RX ADMIN — Medication 100 MILLIGRAM(S): at 13:27

## 2017-08-30 RX ADMIN — SIMVASTATIN 20 MILLIGRAM(S): 20 TABLET, FILM COATED ORAL at 21:16

## 2017-08-30 RX ADMIN — GABAPENTIN 100 MILLIGRAM(S): 400 CAPSULE ORAL at 13:27

## 2017-08-30 RX ADMIN — Medication 2: at 08:19

## 2017-08-30 RX ADMIN — GABAPENTIN 100 MILLIGRAM(S): 400 CAPSULE ORAL at 05:27

## 2017-08-30 RX ADMIN — Medication 81 MILLIGRAM(S): at 11:26

## 2017-08-30 RX ADMIN — Medication 100 MILLIGRAM(S): at 05:29

## 2017-08-30 RX ADMIN — LIDOCAINE 1 PATCH: 4 CREAM TOPICAL at 11:27

## 2017-08-30 RX ADMIN — OXYCODONE HYDROCHLORIDE 5 MILLIGRAM(S): 5 TABLET ORAL at 19:20

## 2017-08-30 RX ADMIN — Medication 4: at 18:19

## 2017-08-30 RX ADMIN — Medication 100 MILLIGRAM(S): at 21:16

## 2017-08-30 RX ADMIN — Medication 4: at 11:27

## 2017-08-30 NOTE — PROGRESS NOTE ADULT - SUBJECTIVE AND OBJECTIVE BOX
Patient is a 72y old  Female who presents with a chief complaint of back pain and hottness (28 Aug 2017 17:09)      INTERVAL /OVERNIGHT EVENTS: back pain better    MEDICATIONS  (STANDING):  aspirin enteric coated 81 milliGRAM(s) Oral daily  simvastatin 20 milliGRAM(s) Oral at bedtime  lisinopril 20 milliGRAM(s) Oral daily  enoxaparin Injectable 40 milliGRAM(s) SubCutaneous daily  insulin lispro (HumaLOG) corrective regimen sliding scale   SubCutaneous three times a day before meals  dextrose 5%. 1000 milliLiter(s) (50 mL/Hr) IV Continuous <Continuous>  dextrose 50% Injectable 12.5 Gram(s) IV Push once  dextrose 50% Injectable 25 Gram(s) IV Push once  dextrose 50% Injectable 25 Gram(s) IV Push once  ceFAZolin   IVPB   IV Intermittent   ceFAZolin   IVPB 2000 milliGRAM(s) IV Intermittent every 8 hours  gabapentin 100 milliGRAM(s) Oral three times a day  lidocaine   Patch 1 Patch Transdermal daily    MEDICATIONS  (PRN):  dextrose Gel 1 Dose(s) Oral once PRN Blood Glucose LESS THAN 70 milliGRAM(s)/deciliter  glucagon  Injectable 1 milliGRAM(s) IntraMuscular once PRN Glucose LESS THAN 70 milligrams/deciliter  ondansetron Injectable 4 milliGRAM(s) IV Push every 6 hours PRN Nausea and/or Vomiting  acetaminophen   Tablet 650 milliGRAM(s) Oral every 6 hours PRN For Temp greater than 38 C (100.4 F)  traMADol 25 milliGRAM(s) Oral four times a day PRN Mild Pain (1 - 3)  oxyCODONE    IR 5 milliGRAM(s) Oral four times a day PRN Moderate Pain (4 - 6)  morphine  - Injectable 2 milliGRAM(s) IV Push every 6 hours PRN Severe Pain (7 - 10)      Allergies    No Known Allergies    Intolerances        REVIEW OF SYSTEMS:  CONSTITUTIONAL: No fever, weight loss, or fatigue  EYES: No eye pain, visual disturbances, or discharge  ENMT:  No difficulty hearing, tinnitus, vertigo; No sinus or throat pain  NECK: No pain or stiffness  RESPIRATORY: No cough, wheezing, chills or hemoptysis; No shortness of breath  CARDIOVASCULAR: No chest pain, palpitations, dizziness, or leg swelling  GASTROINTESTINAL: No abdominal or epigastric pain. No nausea, vomiting, or hematemesis; No diarrhea or constipation. No melena or hematochezia.  GENITOURINARY: No dysuria, frequency, hematuria, or incontinence  NEUROLOGICAL: No headaches, memory loss, loss of strength, numbness, or tremors  SKIN: No itching, burning, rashes, or lesions   LYMPH NODES: No enlarged glands  ENDOCRINE: No heat or cold intolerance; No hair loss; No polydipsia or polyuria  MUSCULOSKELETAL: No joint pain or swelling; No muscle, back, or extremity pain  PSYCHIATRIC: No depression, anxiety, mood swings, or difficulty sleeping  HEME/LYMPH: No easy bruising, or bleeding gums  ALLERGY AND IMMUNOLOGIC: No hives or eczema    Vital Signs Last 24 Hrs  T(C): 37.4 (30 Aug 2017 13:40), Max: 39.3 (29 Aug 2017 19:54)  T(F): 99.4 (30 Aug 2017 13:40), Max: 102.8 (29 Aug 2017 19:54)  HR: 100 (30 Aug 2017 13:40) (94 - 105)  BP: 124/75 (30 Aug 2017 13:40) (124/75 - 144/68)  BP(mean): --  RR: 18 (30 Aug 2017 13:40) (18 - 18)  SpO2: 98% (30 Aug 2017 13:40) (94% - 98%)    PHYSICAL EXAM:  GENERAL: NAD, well-groomed, well-developed  HEAD:  Atraumatic, Normocephalic  EYES: EOMI, PERRLA, conjunctiva and sclera clear  ENMT: No tonsillar erythema, exudates, or enlargement; Moist mucous membranes, Good dentition, No lesions  NECK: Supple, No JVD, Normal thyroid  NERVOUS SYSTEM:  Alert & Oriented X3, Good concentration; Motor Strength 5/5 B/L upper and lower extremities; DTRs 2+ intact and symmetric  CHEST/LUNG: Clear to auscultation bilaterally; No rales, rhonchi, wheezing, or rubs  HEART: Regular rate and rhythm; No murmurs, rubs, or gallops  ABDOMEN: Soft, Nontender, Nondistended; Bowel sounds present  EXTREMITIES:  2+ Peripheral Pulses, No clubbing, cyanosis, or edema  LYMPH: No lymphadenopathy noted  SKIN: No rashes or lesions    LABS:                        10.2   7.7   )-----------( 306      ( 30 Aug 2017 06:57 )             30.6       Ca    8.5        29 Aug 2017 06:43          CAPILLARY BLOOD GLUCOSE  243 (30 Aug 2017 11:23)  176 (30 Aug 2017 07:58)          RADIOLOGY & ADDITIONAL TESTS:    Notes Reviewed:  [x ] YES  [ ] NO    Care Discussed with Consultants/Other Providers [x ] YES  [ ] NO

## 2017-08-30 NOTE — PROGRESS NOTE ADULT - SUBJECTIVE AND OBJECTIVE BOX
infectious diseases progress note:    SAMIR CAREY is a 72y y. o. Female patient    Patient reports: feeling better, muscle spasm worse pain of her life now resolved    ROS:    EYES:  Negative  blurry vision or double vision  GASTROINTESTINAL:  Negative for nausea, vomiting, diarrhea  -otherwise negative except for subjective    Allergies    No Known Allergies    Intolerances        ANTIBIOTICS/RELEVANT:  antimicrobials  ceFAZolin   IVPB   IV Intermittent   ceFAZolin   IVPB 2000 milliGRAM(s) IV Intermittent every 8 hours    immunologic:    OTHER:  aspirin enteric coated 81 milliGRAM(s) Oral daily  simvastatin 20 milliGRAM(s) Oral at bedtime  lisinopril 20 milliGRAM(s) Oral daily  enoxaparin Injectable 40 milliGRAM(s) SubCutaneous daily  insulin lispro (HumaLOG) corrective regimen sliding scale   SubCutaneous three times a day before meals  dextrose 5%. 1000 milliLiter(s) IV Continuous <Continuous>  dextrose Gel 1 Dose(s) Oral once PRN  dextrose 50% Injectable 12.5 Gram(s) IV Push once  dextrose 50% Injectable 25 Gram(s) IV Push once  dextrose 50% Injectable 25 Gram(s) IV Push once  glucagon  Injectable 1 milliGRAM(s) IntraMuscular once PRN  ondansetron Injectable 4 milliGRAM(s) IV Push every 6 hours PRN  sodium chloride 0.9%. 1000 milliLiter(s) IV Continuous <Continuous>  acetaminophen   Tablet 650 milliGRAM(s) Oral every 6 hours PRN  traMADol 25 milliGRAM(s) Oral four times a day PRN  oxyCODONE    IR 5 milliGRAM(s) Oral four times a day PRN  morphine  - Injectable 2 milliGRAM(s) IV Push every 6 hours PRN  gabapentin 100 milliGRAM(s) Oral three times a day  lidocaine   Patch 1 Patch Transdermal daily      Objective:  Last 24-Vital Signs Last 24 Hrs  T(C): 38.1 (30 Aug 2017 05:32), Max: 39.3 (29 Aug 2017 19:54)  T(F): 100.5 (30 Aug 2017 05:32), Max: 102.8 (29 Aug 2017 19:54)  HR: 94 (30 Aug 2017 05:32) (94 - 105)  BP: 139/69 (30 Aug 2017 05:32) (132/68 - 144/68)  BP(mean): --  RR: 18 (30 Aug 2017 05:32) (18 - 18)  SpO2: 96% (30 Aug 2017 05:32) (92% - 96%)    T(C): 38.1 (30 Aug 2017 05:32), Max: 39.3 (29 Aug 2017 19:54)  T(F): 100.5 (30 Aug 2017 05:32), Max: 102.8 (29 Aug 2017 19:54)  T(C): 38.1 (30 Aug 2017 05:32), Max: 39.3 (29 Aug 2017 19:54)  T(F): 100.5 (30 Aug 2017 05:32), Max: 102.8 (29 Aug 2017 19:54)  T(C): 38.1 (30 Aug 2017 05:32), Max: 39.3 (29 Aug 2017 19:54)  T(F): 100.5 (30 Aug 2017 05:32), Max: 102.8 (29 Aug 2017 19:54)    PHYSICAL EXAM:  Constitutional:Well-developed, well nourished  Eyes:PERRLA, EOMI  Ear/Nose/Throat: oropharynx normal	  Neck:no JVD, no lymphadenopathy, supple, swelling decreased  Respiratory: no accessory muscle use, lung fields bilaterally clear  Cardiovascular:RRR, normal S1, S2 no m/r/g  Gastrointestinal:soft, NT, no HSM, BS-normal  Extremities:no clubbing, no cyanosis, edema absent  Neuro-patient alert, oriented and appropriate  Skin-no sig lesions      LABS:                        10.2   7.7   )-----------( 306      ( 30 Aug 2017 06:57 )             30.6       WBC 7.7  08-30 @ 06:57  WBC 15.4   @ 06:43  WBC 19.3   @ 16:43          135  |  100  |  17  ----------------------------<  145<H>  4.4   |  27  |  0.70    Ca    8.5      29 Aug 2017 06:43    TPro  7.7  /  Alb  3.0<L>  /  TBili  0.4  /  DBili  x   /  AST  21  /  ALT  40  /  AlkPhos  83        Urinalysis Basic - ( 28 Aug 2017 15:58 )    Color: Yellow / Appearance: Clear / S.015 / pH: x  Gluc: x / Ketone: Negative  / Bili: Negative / Urobili: Negative   Blood: x / Protein: Negative / Nitrite: Negative   Leuk Esterase: Negative / RBC: x / WBC x   Sq Epi: x / Non Sq Epi: x / Bacteria: x          MICROBIOLOGY:    Culture - Blood (17 @ 12:57)    Gram Stain:   Growth in aerobic and anaerobic bottles: Gram Positive Cocci in Clusters    Specimen Source: .Blood Blood    Culture Results:   Growth in aerobic and anaerobic bottles: Gram Positive Cocci in Clusters        RADIOLOGY & ADDITIONAL STUDIES:

## 2017-08-30 NOTE — PROGRESS NOTE ADULT - PROBLEM SELECTOR PLAN 1
Russ with persistant bacteremia. Continue IV abx, patient hesitant to get repeat MRI so start with obtaining outside MRI for review. Critical to get TTE and continue to follow blood cultures.

## 2017-08-30 NOTE — PROGRESS NOTE ADULT - PROBLEM SELECTOR PLAN 1
cellulitis of back  check cultures  iv abx per ID    id eval with Dr. REYES / ROSIE appreciated  monitor and trend lactate  trend labs and temp

## 2017-08-30 NOTE — DIETITIAN INITIAL EVALUATION ADULT. - OTHER INFO
patient reports with good PO intake now working off alternate menu selections. states on medifast with endocrinologist supervision . with MD rx for weight loss. states blood sugars usually  at home over last week -255 with dx cellulitis noted. states on glimepiride which doctor increased dose recently. states does not eat bagels anymore , verbal review of carb counting patient refusing consistent cho handout at this time. does not know what usual A1c level is.

## 2017-08-30 NOTE — DIETITIAN INITIAL EVALUATION ADULT. - NS AS NUTRI INTERV ED CONTENT
patient refusing education at this time but verbal review of consistent carbohydrate diet given/Other (specify)

## 2017-08-30 NOTE — PROGRESS NOTE ADULT - SUBJECTIVE AND OBJECTIVE BOX
Neurology Follow up note    SAMIR [id number]    HPI:  pt presents with worsening back pain that started in lower back and now going up to the shoulders, also complains of back feeling very hot. no recent sun exposure, back has always been covered with shirt, no fever or chills but has been taking tylenol for pain. seen by her endocrinologist today for dm management and advised to come to er as her bp was elevated and she was tachycardic. (28 Aug 2017 17:09)    Interval History - back/neck pain better.    Patient is seen, chart was reviewed and case was discussed with the treatment team.  Pt is not in any distress.   Lying on bed comfortably.   No events reported overnight.   No clinical seizure was reported.  Sitting on chair bed comfortably.    is at bedside.    Vital Signs Last 24 Hrs  T(C): 38.1 (30 Aug 2017 05:32), Max: 39.3 (29 Aug 2017 19:54)  T(F): 100.5 (30 Aug 2017 05:32), Max: 102.8 (29 Aug 2017 19:54)  HR: 94 (30 Aug 2017 05:32) (94 - 105)  BP: 139/69 (30 Aug 2017 05:32) (132/68 - 144/68)  BP(mean): --  RR: 18 (30 Aug 2017 05:32) (18 - 18)  SpO2: 96% (30 Aug 2017 05:32) (92% - 96%)        REVIEW OF SYSTEMS:    Constitutional: No fever, weight loss or fatigue  Eyes: No eye pain, visual disturbances, or discharge  ENT:  No difficulty hearing, tinnitus, vertigo; No sinus or throat pain  Neck: No pain or stiffness  Respiratory: No cough, wheezing, chills or hemoptysis  Cardiovascular: No chest pain, palpitations, shortness of breath, dizziness or leg swelling  Gastrointestinal: No abdominal or epigastric pain. No nausea, vomiting or hematemesis; No diarrhea or constipation. No melena or hematochezia.  Genitourinary: No dysuria, frequency, hematuria or incontinence  Neurological: No headaches, memory loss, loss of strength, numbness or tremors  Psychiatric: No depression, anxiety, mood swings or difficulty sleeping  Musculoskeletal: No joint pain or swelling; No muscle, back or extremity pain  Skin: No itching, burning, rashes or lesions   Lymph Nodes: No enlarged glands  Endocrine: No heat or cold intolerance; No hair loss, No h/o diabetes or thyroid dysfunction  Allergy and Immunologic: No hives or eczema    On Neurological Examination:    Mental Status - Pt is alert, awake, oriented X3.. Follows commands well and able to answer questions appropriately .Mood and affect  normal    Speech -  Normal.    Cranial Nerves - Pupils 3 mm equal and reactive to light, extraocular eye movements intact. Pt has no visual field deficit.  Pt has no  facial asymmetry. Facial sensation is intact. Tongue - is in midline.    Muscle tone - is normal all over.      Motor Exam - 5/5 of UE.  LE 5-/5.    Sensory Exam -. Pt withdraws all extremities equally on stimulation. No asymmetry seen. No complaints of tingling, numbness.    Gait - Able to stand and walk unassisted.          coordination:    Finger to nose: normal.      Deep tendon Reflexes - 2 plus all over.        Romberg - Negative.    Neck Supple -  Yes.     MEDICATIONS    aspirin enteric coated 81 milliGRAM(s) Oral daily  simvastatin 20 milliGRAM(s) Oral at bedtime  lisinopril 20 milliGRAM(s) Oral daily  enoxaparin Injectable 40 milliGRAM(s) SubCutaneous daily  insulin lispro (HumaLOG) corrective regimen sliding scale   SubCutaneous three times a day before meals  dextrose 5%. 1000 milliLiter(s) IV Continuous <Continuous>  dextrose Gel 1 Dose(s) Oral once PRN  dextrose 50% Injectable 12.5 Gram(s) IV Push once  dextrose 50% Injectable 25 Gram(s) IV Push once  dextrose 50% Injectable 25 Gram(s) IV Push once  glucagon  Injectable 1 milliGRAM(s) IntraMuscular once PRN  ondansetron Injectable 4 milliGRAM(s) IV Push every 6 hours PRN  acetaminophen   Tablet 650 milliGRAM(s) Oral every 6 hours PRN  ceFAZolin   IVPB   IV Intermittent   ceFAZolin   IVPB 2000 milliGRAM(s) IV Intermittent every 8 hours  traMADol 25 milliGRAM(s) Oral four times a day PRN  oxyCODONE    IR 5 milliGRAM(s) Oral four times a day PRN  morphine  - Injectable 2 milliGRAM(s) IV Push every 6 hours PRN  gabapentin 100 milliGRAM(s) Oral three times a day  lidocaine   Patch 1 Patch Transdermal daily      Allergies    No Known Allergies    Intolerances        LABS:  CBC Full  -  ( 30 Aug 2017 06:57 )  WBC Count : 7.7 K/uL  Hemoglobin : 10.2 g/dL  Hematocrit : 30.6 %  Platelet Count - Automated : 306 K/uL  Mean Cell Volume : 90.1 fl  Mean Cell Hemoglobin : 30.2 pg  Mean Cell Hemoglobin Concentration : 33.5 gm/dL  Auto Neutrophil # : x  Auto Lymphocyte # : x  Auto Monocyte # : x  Auto Eosinophil # : x  Auto Basophil # : x  Auto Neutrophil % : x  Auto Lymphocyte % : x  Auto Monocyte % : x  Auto Eosinophil % : x  Auto Basophil % : x    Urinalysis Basic - ( 28 Aug 2017 15:58 )    Color: Yellow / Appearance: Clear / S.015 / pH: x  Gluc: x / Ketone: Negative  / Bili: Negative / Urobili: Negative   Blood: x / Protein: Negative / Nitrite: Negative   Leuk Esterase: Negative / RBC: x / WBC x   Sq Epi: x / Non Sq Epi: x / Bacteria: x          135  |  100  |  17  ----------------------------<  145<H>  4.4   |  27  |  0.70    Ca    8.5      29 Aug 2017 06:43    TPro  7.7  /  Alb  3.0<L>  /  TBili  0.4  /  DBili  x   /  AST  21  /  ALT  40  /  AlkPhos  83      Hemoglobin A1C:     Vitamin B12     RADIOLOGY.    ASSESSMENT AND PLAN:        LIKELY LUMBAR RADICULOPATHY .  STAPH BACTEREMIA.    ANTIBIOTIC AS PER ID.  CONTINUE LIDODERM, NEURONTIN AND  ANALGESIC,   I CALLED DR DAVIES OFFICE ABOUT MRI OF CERVICAL AND LUMBAR SPINE DONE RECENTLY.  HIS OFFICE PROVIDED ME NAME OF FACILITIES PERFORMED HER MRI.  WOULD GET RESULTS.  DW DR YOUNG AND DR VELEZ.  PATIENT VERY RELUCTANT TO GO FOE ANOTHER MRI.  Pain is accessed and addressed.  Would continue to follow.

## 2017-08-30 NOTE — GOALS OF CARE CONVERSATION - PERSONAL ADVANCE DIRECTIVE - CONVERSATION DETAILS
met pt, has no hcp, pt interested in completing hcp form, but needs to discuss w her family who will be her hcp & her wishes for directives. contact # given, will follow up

## 2017-08-31 DIAGNOSIS — R50.9 FEVER, UNSPECIFIED: ICD-10-CM

## 2017-08-31 LAB
-  AMPICILLIN/SULBACTAM: SIGNIFICANT CHANGE UP
-  CEFAZOLIN: SIGNIFICANT CHANGE UP
-  CIPROFLOXACIN: SIGNIFICANT CHANGE UP
-  CLINDAMYCIN: SIGNIFICANT CHANGE UP
-  ERYTHROMYCIN: SIGNIFICANT CHANGE UP
-  GENTAMICIN: SIGNIFICANT CHANGE UP
-  LEVOFLOXACIN: SIGNIFICANT CHANGE UP
-  MOXIFLOXACIN(AEROBIC): SIGNIFICANT CHANGE UP
-  OXACILLIN: SIGNIFICANT CHANGE UP
-  RIFAMPIN: SIGNIFICANT CHANGE UP
-  TETRACYCLINE: SIGNIFICANT CHANGE UP
-  TRIMETHOPRIM/SULFAMETHOXAZOLE: SIGNIFICANT CHANGE UP
-  VANCOMYCIN: SIGNIFICANT CHANGE UP
CULTURE RESULTS: SIGNIFICANT CHANGE UP
ERYTHROCYTE [SEDIMENTATION RATE] IN BLOOD: 101 MM/HR — HIGH (ref 0–20)
METHOD TYPE: SIGNIFICANT CHANGE UP
ORGANISM # SPEC MICROSCOPIC CNT: SIGNIFICANT CHANGE UP
SPECIMEN SOURCE: SIGNIFICANT CHANGE UP

## 2017-08-31 PROCEDURE — 93306 TTE W/DOPPLER COMPLETE: CPT | Mod: 26

## 2017-08-31 RX ADMIN — Medication 100 MILLIGRAM(S): at 05:32

## 2017-08-31 RX ADMIN — GABAPENTIN 100 MILLIGRAM(S): 400 CAPSULE ORAL at 05:32

## 2017-08-31 RX ADMIN — Medication 4: at 08:18

## 2017-08-31 RX ADMIN — GABAPENTIN 100 MILLIGRAM(S): 400 CAPSULE ORAL at 13:52

## 2017-08-31 RX ADMIN — Medication 100 MILLIGRAM(S): at 13:52

## 2017-08-31 RX ADMIN — Medication 650 MILLIGRAM(S): at 05:33

## 2017-08-31 RX ADMIN — LISINOPRIL 20 MILLIGRAM(S): 2.5 TABLET ORAL at 05:32

## 2017-08-31 RX ADMIN — ENOXAPARIN SODIUM 40 MILLIGRAM(S): 100 INJECTION SUBCUTANEOUS at 11:40

## 2017-08-31 RX ADMIN — LIDOCAINE 1 PATCH: 4 CREAM TOPICAL at 23:08

## 2017-08-31 RX ADMIN — SIMVASTATIN 20 MILLIGRAM(S): 20 TABLET, FILM COATED ORAL at 22:49

## 2017-08-31 RX ADMIN — Medication 81 MILLIGRAM(S): at 11:40

## 2017-08-31 RX ADMIN — Medication 4: at 17:32

## 2017-08-31 RX ADMIN — GABAPENTIN 100 MILLIGRAM(S): 400 CAPSULE ORAL at 22:49

## 2017-08-31 RX ADMIN — LIDOCAINE 1 PATCH: 4 CREAM TOPICAL at 11:40

## 2017-08-31 RX ADMIN — Medication 100 MILLIGRAM(S): at 22:49

## 2017-08-31 RX ADMIN — Medication 6: at 11:39

## 2017-08-31 NOTE — PROGRESS NOTE ADULT - SUBJECTIVE AND OBJECTIVE BOX
Patient is a 72y old  Female who presents with a chief complaint of back pain and hottness (28 Aug 2017 17:09)      INTERVAL /OVERNIGHT EVENTS: feels much better    MEDICATIONS  (STANDING):  aspirin enteric coated 81 milliGRAM(s) Oral daily  simvastatin 20 milliGRAM(s) Oral at bedtime  lisinopril 20 milliGRAM(s) Oral daily  enoxaparin Injectable 40 milliGRAM(s) SubCutaneous daily  insulin lispro (HumaLOG) corrective regimen sliding scale   SubCutaneous three times a day before meals  dextrose 5%. 1000 milliLiter(s) (50 mL/Hr) IV Continuous <Continuous>  dextrose 50% Injectable 12.5 Gram(s) IV Push once  dextrose 50% Injectable 25 Gram(s) IV Push once  dextrose 50% Injectable 25 Gram(s) IV Push once  ceFAZolin   IVPB   IV Intermittent   ceFAZolin   IVPB 2000 milliGRAM(s) IV Intermittent every 8 hours  gabapentin 100 milliGRAM(s) Oral three times a day  lidocaine   Patch 1 Patch Transdermal daily    MEDICATIONS  (PRN):  dextrose Gel 1 Dose(s) Oral once PRN Blood Glucose LESS THAN 70 milliGRAM(s)/deciliter  glucagon  Injectable 1 milliGRAM(s) IntraMuscular once PRN Glucose LESS THAN 70 milligrams/deciliter  ondansetron Injectable 4 milliGRAM(s) IV Push every 6 hours PRN Nausea and/or Vomiting  acetaminophen   Tablet 650 milliGRAM(s) Oral every 6 hours PRN For Temp greater than 38 C (100.4 F)  traMADol 25 milliGRAM(s) Oral four times a day PRN Mild Pain (1 - 3)  oxyCODONE    IR 5 milliGRAM(s) Oral four times a day PRN Moderate Pain (4 - 6)  morphine  - Injectable 2 milliGRAM(s) IV Push every 6 hours PRN Severe Pain (7 - 10)      Allergies    No Known Allergies    Intolerances        REVIEW OF SYSTEMS:  CONSTITUTIONAL: No fever, weight loss, or fatigue  EYES: No eye pain, visual disturbances, or discharge  ENMT:  No difficulty hearing, tinnitus, vertigo; No sinus or throat pain  NECK: No pain or stiffness  RESPIRATORY: No cough, wheezing, chills or hemoptysis; No shortness of breath  CARDIOVASCULAR: No chest pain, palpitations, dizziness, or leg swelling  GASTROINTESTINAL: No abdominal or epigastric pain. No nausea, vomiting, or hematemesis; No diarrhea or constipation. No melena or hematochezia.  GENITOURINARY: No dysuria, frequency, hematuria, or incontinence  NEUROLOGICAL: No headaches, memory loss, loss of strength, numbness, or tremors  SKIN: No itching, burning, rashes, or lesions   LYMPH NODES: No enlarged glands  ENDOCRINE: No heat or cold intolerance; No hair loss; No polydipsia or polyuria  MUSCULOSKELETAL: No joint pain or swelling; No muscle, back, or extremity pain  PSYCHIATRIC: No depression, anxiety, mood swings, or difficulty sleeping  HEME/LYMPH: No easy bruising, or bleeding gums  ALLERGY AND IMMUNOLOGIC: No hives or eczema    Vital Signs Last 24 Hrs  T(C): 37.2 (31 Aug 2017 14:18), Max: 38.4 (30 Aug 2017 19:36)  T(F): 98.9 (31 Aug 2017 14:18), Max: 101.1 (30 Aug 2017 19:36)  HR: 93 (31 Aug 2017 14:18) (84 - 93)  BP: 138/73 (31 Aug 2017 14:18) (138/73 - 156/91)  BP(mean): --  RR: 20 (31 Aug 2017 14:18) (17 - 20)  SpO2: 97% (31 Aug 2017 14:18) (95% - 97%)    PHYSICAL EXAM:  GENERAL: NAD, well-groomed, well-developed  HEAD:  Atraumatic, Normocephalic  EYES: EOMI, PERRLA, conjunctiva and sclera clear  ENMT: No tonsillar erythema, exudates, or enlargement; Moist mucous membranes, Good dentition, No lesions  NECK: Supple, No JVD, Normal thyroid  NERVOUS SYSTEM:  Alert & Oriented X3, Good concentration; Motor Strength 5/5 B/L upper and lower extremities; DTRs 2+ intact and symmetric  CHEST/LUNG: Clear to auscultation bilaterally; No rales, rhonchi, wheezing, or rubs  HEART: Regular rate and rhythm; No murmurs, rubs, or gallops  ABDOMEN: Soft, Nontender, Nondistended; Bowel sounds present  EXTREMITIES:  2+ Peripheral Pulses, No clubbing, cyanosis, or edema  LYMPH: No lymphadenopathy noted  SKIN: No rashes or lesions    LABS:              CAPILLARY BLOOD GLUCOSE  270 (31 Aug 2017 11:19)  202 (31 Aug 2017 07:27)  212 (30 Aug 2017 21:05)  213 (30 Aug 2017 18:04)          RADIOLOGY & ADDITIONAL TESTS:    Notes Reviewed:  [x ] YES  [ ] NO    Care Discussed with Consultants/Other Providers [x ] YES  [ ] NO

## 2017-08-31 NOTE — PROGRESS NOTE ADULT - SUBJECTIVE AND OBJECTIVE BOX
Progress Note:   · Provider Specialty	Neurology	      · Subjective and Objective: 	  Neurology Follow up note    SAMIR [id number]    HPI:  pt presents with worsening back pain that started in lower back and now going up to the shoulders, also complains of back feeling very hot. no recent sun exposure, back has always been covered with shirt, no fever or chills but has been taking tylenol for pain. seen by her endocrinologist today for dm management and advised to come to er as her bp was elevated and she was tachycardic. (28 Aug 2017 17:09)    Interval History - back pain improved.    Patient is seen, chart was reviewed and case was discussed with the treatment team.  Pt is not in any distress.   Lying on bed comfortably.   No events reported overnight.   No clinical seizure was reported.  Sitting on chair bed comfortably.    is at bedside.    Vital Signs Last 24 Hrs  T(C): 36.4 (31 Aug 2017 05:14), Max: 38.4 (30 Aug 2017 19:36)  T(F): 97.5 (31 Aug 2017 05:14), Max: 101.1 (30 Aug 2017 19:36)  HR: 84 (31 Aug 2017 05:14) (84 - 100)  BP: 156/91 (31 Aug 2017 05:14) (124/75 - 156/91)  BP(mean): --  RR: 17 (31 Aug 2017 05:14) (17 - 18)  SpO2: 95% (31 Aug 2017 05:14) (95% - 98%)      REVIEW OF SYSTEMS:    Constitutional: No fever, weight loss or fatigue  Eyes: No eye pain, visual disturbances, or discharge  ENT:  No difficulty hearing, tinnitus, vertigo; No sinus or throat pain  Neck: No pain or stiffness  Respiratory: No cough, wheezing, chills or hemoptysis  Cardiovascular: No chest pain, palpitations, shortness of breath, dizziness or leg swelling  Gastrointestinal: No abdominal or epigastric pain. No nausea, vomiting or hematemesis; No diarrhea or constipation. No melena or hematochezia.  Genitourinary: No dysuria, frequency, hematuria or incontinence  Neurological: No headaches, memory loss, loss of strength, numbness or tremors  Psychiatric: No depression, anxiety, mood swings or difficulty sleeping  Musculoskeletal: No joint pain or swelling; No muscle, back or extremity pain  Skin: No itching, burning, rashes or lesions   Lymph Nodes: No enlarged glands  Endocrine: No heat or cold intolerance; No hair loss, No h/o diabetes or thyroid dysfunction  Allergy and Immunologic: No hives or eczema    On Neurological Examination:    Mental Status - Pt is alert, awake, oriented X3.. Follows commands well and able to answer questions appropriately .Mood and affect  normal    Speech -  Normal.    Cranial Nerves - Pupils 3 mm equal and reactive to light, extraocular eye movements intact. Pt has no visual field deficit.  Pt has no  facial asymmetry. Facial sensation is intact. Tongue - is in midline.    Muscle tone - is normal all over.      Motor Exam - 5/5 of UE.  LE 5-/5.    Sensory Exam -. Pt withdraws all extremities equally on stimulation. No asymmetry seen. No complaints of tingling, numbness.    Gait - Able to stand and walk unassisted.          coordination:    Finger to nose: normal.      Deep tendon Reflexes - 2 plus all over.        Romberg - Negative.    Neck Supple -  Yes.     MEDICATIONS    aspirin enteric coated 81 milliGRAM(s) Oral daily  simvastatin 20 milliGRAM(s) Oral at bedtime  lisinopril 20 milliGRAM(s) Oral daily  enoxaparin Injectable 40 milliGRAM(s) SubCutaneous daily  insulin lispro (HumaLOG) corrective regimen sliding scale   SubCutaneous three times a day before meals  dextrose 5%. 1000 milliLiter(s) IV Continuous <Continuous>  dextrose Gel 1 Dose(s) Oral once PRN  dextrose 50% Injectable 12.5 Gram(s) IV Push once  dextrose 50% Injectable 25 Gram(s) IV Push once  dextrose 50% Injectable 25 Gram(s) IV Push once  glucagon  Injectable 1 milliGRAM(s) IntraMuscular once PRN  ondansetron Injectable 4 milliGRAM(s) IV Push every 6 hours PRN  acetaminophen   Tablet 650 milliGRAM(s) Oral every 6 hours PRN  ceFAZolin   IVPB   IV Intermittent   ceFAZolin   IVPB 2000 milliGRAM(s) IV Intermittent every 8 hours  traMADol 25 milliGRAM(s) Oral four times a day PRN  oxyCODONE    IR 5 milliGRAM(s) Oral four times a day PRN  morphine  - Injectable 2 milliGRAM(s) IV Push every 6 hours PRN  gabapentin 100 milliGRAM(s) Oral three times a day  lidocaine   Patch 1 Patch Transdermal daily      Allergies    No Known Allergies    Intolerances        LABS:  CBC Full  -  ( 30 Aug 2017 06:57 )  WBC Count : 7.7 K/uL  Hemoglobin : 10.2 g/dL  Hematocrit : 30.6 %  Platelet Count - Automated : 306 K/uL  Mean Cell Volume : 90.1 fl  Mean Cell Hemoglobin : 30.2 pg  Mean Cell Hemoglobin Concentration : 33.5 gm/dL  Auto Neutrophil # : x  Auto Lymphocyte # : x  Auto Monocyte # : x  Auto Eosinophil # : x  Auto Basophil # : x  Auto Neutrophil % : x  Auto Lymphocyte % : x  Auto Monocyte % : x  Auto Eosinophil % : x  Auto Basophil % : x    Urinalysis Basic - ( 28 Aug 2017 15:58 )    Color: Yellow / Appearance: Clear / S.015 / pH: x  Gluc: x / Ketone: Negative  / Bili: Negative / Urobili: Negative   Blood: x / Protein: Negative / Nitrite: Negative   Leuk Esterase: Negative / RBC: x / WBC x   Sq Epi: x / Non Sq Epi: x / Bacteria: x          135  |  100  |  17  ----------------------------<  145<H>  4.4   |  27  |  0.70    Ca    8.5      29 Aug 2017 06:43    TPro  7.7  /  Alb  3.0<L>  /  TBili  0.4  /  DBili  x   /  AST  21  /  ALT  40  /  AlkPhos  83      Hemoglobin A1C:     Vitamin B12     RADIOLOGY.  < from: MRI Lumbar Spine w/wo Cont (17 @ 17:50) >      INTERPRETATION:  [facility] RADIOLOGIST PRELIMINARY REPORT    EXAM:    MR Lumbar Spine Without and With Intravenous Contrast    CLINICAL HISTORY:    72 years old, female; Condition or disease; Scoliosis and other: Staph   bacteremia; Patient HX: Low back pain with staph bacteremia; Additional   info:   Xray report sent    TECHNIQUE:    Magnetic resonance images of the lumbar spine without and with   intravenous   contrast in multiple planes.    CONTRAST:    10 mL of gadavist administered intravenously.    COMPARISON:    [facility] LUMBAR SPINE MRI WO CONT 2013 8:17:25 AM    FINDINGS:    Vertebrae:  Unremarkable.  No acute fracture.    Spinal cord:  Unremarkable.  Normal signal.  No abnormal enhancement.    Soft tissues:  Unremarkable.     DISCS/SPINAL CANAL/NEURAL FORAMINA:    L1-L2:  Unremarkable.  No significant disc disease.  No stenosis.    L2-L3:  Unremarkable.  No significant disc disease.  No stenosis.    L3-L4:  Unremarkable.  No significant disc disease.  No stenosis.    L4-L5:  Unremarkable.  No significant disc disease.  No stenosis.    L5-S1:  Unremarkable.  No significant disc disease.  No stenosis.    IMPRESSION:         Normal lumbar spine MRI.    _______________________________________________    EXAM:    MR Lumbar Spine Without and With Intravenous Contrast    CLINICAL HISTORY:    72 years old, female; Condition or disease; Scoliosis and other: Staph   bacteremia; Patient HX: Low back pain with staph bacteremia; Additional   info:   Xray report sent    TECHNIQUE:    Magnetic resonance images of the lumbar spine without and with   intravenous   contrast in multiple planes.    CONTRAST:    10 mL of gadavist administered intravenously.    COMPARISON:    [facility] LUMBAR SPINE MRI WO CONT 2013 8:17:25 AM    FINDINGS:    Vertebrae:  There is moderate to marked dextroscoliosis and rotatory   scoliosis of the mid to upper lumbar spine.    Spinal cord:  Unremarkable.  Normal signal.  No abnormal enhancement.    Sacrum/coccyx:  The prior report is not available for comparison,no   films   are available. The lowest disc appears sacralized. For purposes of this   dictation, the sacralized lowest disc is termed S1-2. Careful correlation   with   a summary system is recommended.    Soft tissues:  Unremarkable.     DISCS/SPINAL CANAL/NEURAL FORAMINA:    L1-L2:  There is a 3 mm diffuse disc bulge/protrusion. There is   moderate   proximal right neuroforaminal stenosis. There is moderate disc narrowing   with   disc desiccation at all lumbar disc levels.    L2-L3:  There is a 3 mm diffuse disc bulge most pronounced   posterolaterally,   bilaterally. This narrowing is more pronounced left laterally, with mild   to   moderate left neural foraminal stenosis. No central stenosis.    L3-L4:  There is moderate left lateral disc narrowing and degenerative   change   with a nearly 3 mm diffuse disc bulge/protrusion. There is mild left   neural   foraminal stenosis.    L4-L5:  There is a grade 1 anterolisthesis of L4 on L5 with severe   right and   moderate left facet hypertrophy. There is an up to 3 mm diffuse disc   bulge/protrusion. This narrowing is more pronounced right laterally,   where   there is moderate to severe right neural foraminal stenosis. There is   mild   central stenosis with an 8-9 mm AP canal diameter.    L5-S1:  There is severe disc narrowing with a 2 mm anterolisthesis.   This   narrowing is more pronounced right laterally, where there is a 3 mm right   posterolateral disc bulge/protrusion and osseous right. There is moderate   to   severe rightneural foraminal stenosis. No central or neuroforaminal   stenosis.   There is facet arthropathy.    Other findings:  S1 to: There is mild facet arthropathy with no disc   herniation or stenosis.    IMPRESSION:         1. Moderate to severe scoliosisas detailed above. Transitional lumbar   spine.   He lowest disc is termed S1-2.    2. Severe, diffuse lumbar spondylosis with multilevel disc bulges or   protrusions producing multilevel central and especially neuroforaminal   stenosis   as detailedon a level by level basis of the report above. There has been   slight generalized progression of multilevel degenerative change compared   to   the prior study of 2013.            ******PRELIMINARY REPORT******            MARK FRAZIER M.D.;[facility] RADIOLOGIST           ASSESSMENT AND PLAN:        LIKELY LUMBAR RADICULOPATHY .  STAPH BACTEREMIA.  NO DISCITIS OF LUMBAR SPINE.    ANTIBIOTIC AS PER ID.  CONTINUE LIDODERM, NEURONTIN AND  ANALGESIC,   PT.  Pain is accessed and addressed.  Would continue to follow.

## 2017-08-31 NOTE — PROGRESS NOTE ADULT - SUBJECTIVE AND OBJECTIVE BOX
infectious diseases progress note:    SAMIR CAREY is a 72y y. o. Female patient    Patient reports: she is tired from doing so much, back and neck pain now all resolved    ROS:    EYES:  Negative  blurry vision or double vision  GASTROINTESTINAL:  Negative for nausea, vomiting, diarrhea  -otherwise negative except for subjective    Allergies    No Known Allergies    Intolerances        ANTIBIOTICS/RELEVANT:  antimicrobials  ceFAZolin   IVPB   IV Intermittent   ceFAZolin   IVPB 2000 milliGRAM(s) IV Intermittent every 8 hours    immunologic:    OTHER:  aspirin enteric coated 81 milliGRAM(s) Oral daily  simvastatin 20 milliGRAM(s) Oral at bedtime  lisinopril 20 milliGRAM(s) Oral daily  enoxaparin Injectable 40 milliGRAM(s) SubCutaneous daily  insulin lispro (HumaLOG) corrective regimen sliding scale   SubCutaneous three times a day before meals  dextrose 5%. 1000 milliLiter(s) IV Continuous <Continuous>  dextrose Gel 1 Dose(s) Oral once PRN  dextrose 50% Injectable 12.5 Gram(s) IV Push once  dextrose 50% Injectable 25 Gram(s) IV Push once  dextrose 50% Injectable 25 Gram(s) IV Push once  glucagon  Injectable 1 milliGRAM(s) IntraMuscular once PRN  ondansetron Injectable 4 milliGRAM(s) IV Push every 6 hours PRN  acetaminophen   Tablet 650 milliGRAM(s) Oral every 6 hours PRN  traMADol 25 milliGRAM(s) Oral four times a day PRN  oxyCODONE    IR 5 milliGRAM(s) Oral four times a day PRN  morphine  - Injectable 2 milliGRAM(s) IV Push every 6 hours PRN  gabapentin 100 milliGRAM(s) Oral three times a day  lidocaine   Patch 1 Patch Transdermal daily      Objective:  Last 24-Vital Signs Last 24 Hrs  T(C): 36.4 (31 Aug 2017 05:14), Max: 38.4 (30 Aug 2017 19:36)  T(F): 97.5 (31 Aug 2017 05:14), Max: 101.1 (30 Aug 2017 19:36)  HR: 84 (31 Aug 2017 05:14) (84 - 100)  BP: 156/91 (31 Aug 2017 05:14) (124/75 - 156/91)  BP(mean): --  RR: 17 (31 Aug 2017 05:14) (17 - 18)  SpO2: 95% (31 Aug 2017 05:14) (95% - 98%)    T(C): 36.4 (31 Aug 2017 05:14), Max: 39.3 (29 Aug 2017 19:54)  T(F): 97.5 (31 Aug 2017 05:14), Max: 102.8 (29 Aug 2017 19:54)  T(C): 36.4 (31 Aug 2017 05:14), Max: 39.3 (29 Aug 2017 19:54)  T(F): 97.5 (31 Aug 2017 05:14), Max: 102.8 (29 Aug 2017 19:54)  T(C): 36.4 (31 Aug 2017 05:14), Max: 39.3 (29 Aug 2017 19:54)  T(F): 97.5 (31 Aug 2017 05:14), Max: 102.8 (29 Aug 2017 19:54)    PHYSICAL EXAM:  Constitutional:Well-developed, well nourished  Eyes:PERRLA, EOMI  Ear/Nose/Throat: oropharynx normal	  Neck:no JVD, no lymphadenopathy, supple  Respiratory: no accessory muscle use, lung fields bilaterally clear  Cardiovascular:RRR, normal S1, S2 no m/r/g  Gastrointestinal:soft, NT, no HSM, BS-normal  Extremities:no clubbing, no cyanosis, edema absent  Neuro-patient alert, oriented and appropriate  Skin-no sig lesions      LABS:                        10.2   7.7   )-----------( 306      ( 30 Aug 2017 06:57 )             30.6       WBC 7.7  08-30 @ 06:57  WBC 15.4  08-29 @ 06:43  WBC 19.3  08-28 @ 16:43          MICROBIOLOGY:    Culture - Blood in AM (08.30.17 @ 10:13)    Specimen Source: .Blood Blood-Venous    Culture Results:   No growth to date.        RADIOLOGY & ADDITIONAL STUDIES:    < from: MRI Lumbar Spine w/wo Cont (08.30.17 @ 17:50) >    EXAM:  MRI LUMBAR SPINE WO W CONT                            PROCEDURE DATE:  08/30/2017          INTERPRETATION:  INDICATION:  Back pain radiculopathy. Staphylococcus   bacteremia.  TECHNIQUE:  Multiplanar lumbar imaging was conducted using a 1.5 Lolis   magnet.  T1 and T2 techniques were incorporated.  Post contrast imaging   was performed. Gadolinium was administered.  COMPARISON EXAMINATION:  No prior    FINDINGS:  ALIGNMENT:  A severe dextroscoliosis is noted. Also there are   subluxationswith degenerative changes at multiple levels. There is a   spondylolisthesis at L4-L5.  VERTEBRAL BODIES:  There is subtle T2 hyperintensity in the upper L1   body. There is T2 hyperintensity and T10 with loss of height suggesting   an acute and/or subacute benign compression. No disc space erosive   changes are seen.  DISC SPACES:   Multiple disc bulges are identified with degenerative changes. A   left-sided herniation is noted at L4-L5. There are also left lateral   protrusions and herniationsat L1-2 and L2-3.  POSTERIOR ELEMENTS:  Hypertrophic changes are most prominent at L4-5.  CANAL AND FORAMINA:  Most narrowed at L4-5.  INTRADURAL SPACE:  No intradural abnormality.  The distal cord is   unremarkable in contour and signal.  MISCELLANEOUS: Following contrast demonstration, there is nonspecific   reactive enhancement in the upper L1 body, and in the T10 body. There is   no enhancing fluid collection or phlegmon. No enhancing intradural   abnormality is seen    IMPRESSION:        1. Moderate to severe dextroscoliosis with multilevel degenerative   changes.  2. Benign-appearing mild compression fracture of T10, with nonspecific   reactive enhancement. Subtle reactive endplate changes within the upper   L1 body as well.  3. No MR evidence of disc space infection or osteomyelitis. No enhancing   intradural abnormality.  4. Multilevel disc disease and degenerative changes with multiple   herniations and/or protrusions and facet arthropathy. Canal most narrowed   at L4-5. See above.

## 2017-08-31 NOTE — PHYSICAL THERAPY INITIAL EVALUATION ADULT - ADDITIONAL COMMENTS
Pt has no steps to enter home. Pt does not own any equipment Pt has no steps to enter home. Pt does not own any equipment. Pt drives.

## 2017-08-31 NOTE — PHYSICAL THERAPY INITIAL EVALUATION ADULT - PERTINENT HX OF CURRENT PROBLEM, REHAB EVAL
Pt admitted from her endocrinologists office for increased pain and temperature of back. Pt states she was having trouble maintaining her sugar levels

## 2017-09-01 DIAGNOSIS — M86.8X9 OTHER OSTEOMYELITIS, UNSPECIFIED SITES: ICD-10-CM

## 2017-09-01 LAB — GRAM STN FLD: SIGNIFICANT CHANGE UP

## 2017-09-01 PROCEDURE — 70543 MRI ORBT/FAC/NCK W/O &W/DYE: CPT | Mod: 26

## 2017-09-01 PROCEDURE — 77001 FLUOROGUIDE FOR VEIN DEVICE: CPT | Mod: 26

## 2017-09-01 PROCEDURE — 76937 US GUIDE VASCULAR ACCESS: CPT | Mod: 26

## 2017-09-01 PROCEDURE — 36569 INSJ PICC 5 YR+ W/O IMAGING: CPT

## 2017-09-01 RX ADMIN — OXYCODONE HYDROCHLORIDE 5 MILLIGRAM(S): 5 TABLET ORAL at 04:42

## 2017-09-01 RX ADMIN — Medication 4: at 12:31

## 2017-09-01 RX ADMIN — Medication 4: at 17:02

## 2017-09-01 RX ADMIN — Medication 100 MILLIGRAM(S): at 13:04

## 2017-09-01 RX ADMIN — GABAPENTIN 100 MILLIGRAM(S): 400 CAPSULE ORAL at 21:45

## 2017-09-01 RX ADMIN — LIDOCAINE 1 PATCH: 4 CREAM TOPICAL at 12:23

## 2017-09-01 RX ADMIN — SIMVASTATIN 20 MILLIGRAM(S): 20 TABLET, FILM COATED ORAL at 21:45

## 2017-09-01 RX ADMIN — Medication 100 MILLIGRAM(S): at 06:36

## 2017-09-01 RX ADMIN — LIDOCAINE 1 PATCH: 4 CREAM TOPICAL at 00:00

## 2017-09-01 RX ADMIN — GABAPENTIN 100 MILLIGRAM(S): 400 CAPSULE ORAL at 06:36

## 2017-09-01 RX ADMIN — OXYCODONE HYDROCHLORIDE 5 MILLIGRAM(S): 5 TABLET ORAL at 22:22

## 2017-09-01 RX ADMIN — LISINOPRIL 20 MILLIGRAM(S): 2.5 TABLET ORAL at 06:36

## 2017-09-01 RX ADMIN — OXYCODONE HYDROCHLORIDE 5 MILLIGRAM(S): 5 TABLET ORAL at 21:52

## 2017-09-01 RX ADMIN — Medication 81 MILLIGRAM(S): at 12:23

## 2017-09-01 RX ADMIN — OXYCODONE HYDROCHLORIDE 5 MILLIGRAM(S): 5 TABLET ORAL at 03:49

## 2017-09-01 RX ADMIN — Medication 100 MILLIGRAM(S): at 21:45

## 2017-09-01 RX ADMIN — Medication 2: at 08:22

## 2017-09-01 RX ADMIN — ENOXAPARIN SODIUM 40 MILLIGRAM(S): 100 INJECTION SUBCUTANEOUS at 12:23

## 2017-09-01 RX ADMIN — GABAPENTIN 100 MILLIGRAM(S): 400 CAPSULE ORAL at 13:05

## 2017-09-01 NOTE — PROGRESS NOTE ADULT - SUBJECTIVE AND OBJECTIVE BOX
infectious diseases progress note:    SAMIR CAREY is a 72y y. o. Female patient    Patient reports: she had another rough night with back and neck pain    ROS:    EYES:  Negative  blurry vision or double vision  GASTROINTESTINAL:  Negative for nausea, vomiting, diarrhea  -otherwise negative except for subjective    Allergies    No Known Allergies    Intolerances        ANTIBIOTICS/RELEVANT:  antimicrobials  ceFAZolin   IVPB   IV Intermittent   ceFAZolin   IVPB 2000 milliGRAM(s) IV Intermittent every 8 hours    immunologic:    OTHER:  aspirin enteric coated 81 milliGRAM(s) Oral daily  simvastatin 20 milliGRAM(s) Oral at bedtime  lisinopril 20 milliGRAM(s) Oral daily  enoxaparin Injectable 40 milliGRAM(s) SubCutaneous daily  insulin lispro (HumaLOG) corrective regimen sliding scale   SubCutaneous three times a day before meals  dextrose 5%. 1000 milliLiter(s) IV Continuous <Continuous>  dextrose Gel 1 Dose(s) Oral once PRN  dextrose 50% Injectable 12.5 Gram(s) IV Push once  dextrose 50% Injectable 25 Gram(s) IV Push once  dextrose 50% Injectable 25 Gram(s) IV Push once  glucagon  Injectable 1 milliGRAM(s) IntraMuscular once PRN  ondansetron Injectable 4 milliGRAM(s) IV Push every 6 hours PRN  acetaminophen   Tablet 650 milliGRAM(s) Oral every 6 hours PRN  traMADol 25 milliGRAM(s) Oral four times a day PRN  oxyCODONE    IR 5 milliGRAM(s) Oral four times a day PRN  morphine  - Injectable 2 milliGRAM(s) IV Push every 6 hours PRN  gabapentin 100 milliGRAM(s) Oral three times a day  lidocaine   Patch 1 Patch Transdermal daily      Objective:  Last 24-Vital Signs Last 24 Hrs  T(C): 37.2 (01 Sep 2017 05:05), Max: 37.3 (31 Aug 2017 20:11)  T(F): 98.9 (01 Sep 2017 05:05), Max: 99.1 (31 Aug 2017 20:11)  HR: 69 (01 Sep 2017 05:05) (69 - 93)  BP: 127/79 (01 Sep 2017 05:05) (120/70 - 138/73)  BP(mean): --  RR: 17 (01 Sep 2017 05:05) (17 - 20)  SpO2: 96% (01 Sep 2017 05:05) (94% - 97%)    T(C): 37.2 (01 Sep 2017 05:05), Max: 38.4 (30 Aug 2017 19:36)  T(F): 98.9 (01 Sep 2017 05:05), Max: 101.1 (30 Aug 2017 19:36)  T(C): 37.2 (01 Sep 2017 05:05), Max: 39.3 (29 Aug 2017 19:54)  T(F): 98.9 (01 Sep 2017 05:05), Max: 102.8 (29 Aug 2017 19:54)  T(C): 37.2 (01 Sep 2017 05:05), Max: 39.3 (29 Aug 2017 19:54)  T(F): 98.9 (01 Sep 2017 05:05), Max: 102.8 (29 Aug 2017 19:54)    PHYSICAL EXAM:  Constitutional:Well-developed, well nourished  Eyes:PERRLA, EOMI  Ear/Nose/Throat: oropharynx normal	  Neck:no JVD, no lymphadenopathy, supple, tender area in right neck with some swelling  Respiratory: no accessory muscle use, lung fields bilaterally clear  Cardiovascular:RRR, normal S1, S2 no m/r/g  Gastrointestinal:soft, NT, no HSM, BS-normal  Extremities:no clubbing, no cyanosis, edema absent  Neuro-patient alert, oriented and appropriate  Skin-no sig lesions      LABS:      WBC 7.7  08-30 @ 06:57  WBC 15.4  08-29 @ 06:43  WBC 19.3  08-28 @ 16:43    Sedimentation Rate, Erythrocyte: 101 mm/hr (08.31.17 @ 06:28)          MICROBIOLOGY:    Culture - Blood in AM (08.30.17 @ 10:13)    Specimen Source: .Blood Blood-Peripheral    Culture Results:   No growth to date.        RADIOLOGY & ADDITIONAL STUDIES:    < from: TTE Echo Doppler w/o Cont (08.31.17 @ 15:09) >     EXAM:  ECHO TTE W/O CON COMP W/DOPPLR         PROCEDURE DATE:  08/31/2017        INTERPRETATION:  INDICATION: Endocarditis  Referring M.D.:Alamuri  Blood Pressure 138/73        Weight (kg) :81.6     Height (cm):165       BSA (sq m): 1.9  Technician: PH    Dimensions:    LA 3.2       Normal Values: 2.0 - 4.0 cm    Ao 2.8        Normal Values: 2.0 - 3.8 cm  SEPTUM 1.3       Normal Values: 0.6 - 1.2 cm  PWT 1.2       Normal Values: 0.6 - 1.1 cm  LVIDd 4.4         Normal Values: 3.0 - 5.6 cm  LVIDs 2.4         Normal Values: 1.8 - 4.0 cm      OBSERVATIONS:  Technically difficult study  Mitral Valve: Mildly calcified mitral annulus and with normal appear MV   leaflets. Mild mitral regurgitation. No gross vegetations appreciated  Aortic Valve/Aorta: Normal trileaflet aortic valve. No gross vegetations   appreciated  Tricuspid Valve: Normal tricuspid valve. Trace tricuspid regurgitation.   No gross vegetations appreciated.  Pulmonic Valve: The pulmonic valve is not well visualized. Probably   normal.  Left Atrium: Mildly enlarged  Right Atrium: Normal  Left Ventricle: Overall preserved left ventricular function. No gross   wall motion abnormalities noted.. The EF is approximately 65-70%.  Right Ventricle: Normal right ventricular size andfunction.  Pericardium/Pleura: No pericardial effusion noted.  Pulmonary/RV Pressure: The right ventricular systolic pressure is   estimated to be 24mmHg, assuming that the right atrial pressure is   estimated to be 8 mmHg. This is consistent with normal pulmonary   pressures.  LV Diastolic Function: E to a reversal consistent with reduced left   ventricular compliance.    Conclusion: Overall preserved left ventricular function. No gross   valvular abnormalities are noted. No gross vegetations were noted. If   clinically warranted a TADEO can be pursued to rule out endocarditis.

## 2017-09-01 NOTE — PROGRESS NOTE ADULT - PROBLEM SELECTOR PLAN 1
Concerns with MSSA bacteremia with no clear source. Continue IV abx, MRI not identifying source.  TTE unremarkable and blood cultures no negative.  Still with original concern for right neck source and would pursue imaging of this area.  Without identified source and persistance of bacteremia previously unclear duration of antibiotics.  Will discuss with Dr Campos.

## 2017-09-01 NOTE — CONSULT NOTE ADULT - SUBJECTIVE AND OBJECTIVE BOX
72F community ambulator admitted 8/28 after being sent in by endocrinologist for increasing neck pain over the last week and hypertension.  Further sepsis workup revealed cellulitis and MSSA bacteremia of unknown origin, on MRI then found to have C3-4 facet osteomyelitis and suboccipital abscess. Patient already has PICC line in place.    Denies any numbness, weakness or paresthesias. Denies any bowel/bladder incontinence.  Denies loss of fine motor control.  Denies gait ataxia but states gait limited over last week secondary to pain.    PAST MEDICAL & SURGICAL HISTORY:  Scoliosis  Basal cell cancer: skin  OA (osteoarthritis)  Hypertension  Elevated cholesterol  Diabetes mellitus  H/O arthroscopic knee surgery: left meniscus-- 25 years ago  H/O: hysterectomy      FAMILY HISTORY:      SOCIAL HISTORY:     Vital Signs Last 24 Hrs  T(C): 36.7 (01 Sep 2017 14:41), Max: 37.3 (31 Aug 2017 20:11)  T(F): 98 (01 Sep 2017 14:41), Max: 99.1 (31 Aug 2017 20:11)  HR: 92 (01 Sep 2017 14:41) (69 - 92)  BP: 149/83 (01 Sep 2017 14:41) (120/70 - 149/83)  BP(mean): --  RR: 16 (01 Sep 2017 14:41) (16 - 19)  SpO2: 98% (01 Sep 2017 14:41) (94% - 98%)  I&O's Detail    31 Aug 2017 07:01  -  01 Sep 2017 07:00  --------------------------------------------------------  IN:    Solution: 150 mL  Total IN: 150 mL    OUT:  Total OUT: 0 mL    Total NET: 150 mL      01 Sep 2017 07:01  -  01 Sep 2017 17:23  --------------------------------------------------------  IN:    Solution: 50 mL  Total IN: 50 mL    OUT:  Total OUT: 0 mL    Total NET: 50 mL      LABS:  No new labs.        PHYSICAL EXAM:  General; Awake and alert, Oriented x 3    Spine exam:  Neck: supple, Mild TTP, Mild pain with AROM  Back: skin intact   Extremities:              Sensation:  intact to light touch C5-T1, L3-S1 bilaterally          Motor exam:          Bilateral Upper extremities    Delt      Bicep      Tri      Wrist ext  Wrst Flex       Digit Ext Digit Flex                                                   R         5/5        5/5        5/5       5/5            5/5            5/5       5/5          5/5                                                   L          5/5        5/5        5/5       5/5            5/5            5/5       5/5          5/5         Bilateral Lower ext.     Hip Flx  Hip Ext    Quad   Hamstrg   TA       EHL      GS                                          R        5/5        5/5        5/5        5/5          5/5     5/5      5/5                                          L         5/5        5/5        5/5        5/5          5/5     5/5      5/5    No clonus, negative babinski, negative amador, DTRs 2/4    Secondary Survey: No TTP over bony prominences, SILT, palpable pulses, full/painless range of motion, compartments soft       RADIOLOGY & ADDITIONAL STUDIES:  MRI Face/Orbit/Neck: C3-4 facet osteomyelitis with suboccipital abscess and no cord compression

## 2017-09-01 NOTE — PROGRESS NOTE ADULT - SUBJECTIVE AND OBJECTIVE BOX
Patient is a 72y old  Female who presents with a chief complaint of back pain and hottness (28 Aug 2017 17:09)      INTERVAL /OVERNIGHT EVENTS: still c/o neck pain    MEDICATIONS  (STANDING):  aspirin enteric coated 81 milliGRAM(s) Oral daily  simvastatin 20 milliGRAM(s) Oral at bedtime  lisinopril 20 milliGRAM(s) Oral daily  enoxaparin Injectable 40 milliGRAM(s) SubCutaneous daily  insulin lispro (HumaLOG) corrective regimen sliding scale   SubCutaneous three times a day before meals  dextrose 5%. 1000 milliLiter(s) (50 mL/Hr) IV Continuous <Continuous>  dextrose 50% Injectable 12.5 Gram(s) IV Push once  dextrose 50% Injectable 25 Gram(s) IV Push once  dextrose 50% Injectable 25 Gram(s) IV Push once  ceFAZolin   IVPB   IV Intermittent   ceFAZolin   IVPB 2000 milliGRAM(s) IV Intermittent every 8 hours  gabapentin 100 milliGRAM(s) Oral three times a day  lidocaine   Patch 1 Patch Transdermal daily    MEDICATIONS  (PRN):  dextrose Gel 1 Dose(s) Oral once PRN Blood Glucose LESS THAN 70 milliGRAM(s)/deciliter  glucagon  Injectable 1 milliGRAM(s) IntraMuscular once PRN Glucose LESS THAN 70 milligrams/deciliter  ondansetron Injectable 4 milliGRAM(s) IV Push every 6 hours PRN Nausea and/or Vomiting  acetaminophen   Tablet 650 milliGRAM(s) Oral every 6 hours PRN For Temp greater than 38 C (100.4 F)  traMADol 25 milliGRAM(s) Oral four times a day PRN Mild Pain (1 - 3)  oxyCODONE    IR 5 milliGRAM(s) Oral four times a day PRN Moderate Pain (4 - 6)  morphine  - Injectable 2 milliGRAM(s) IV Push every 6 hours PRN Severe Pain (7 - 10)      Allergies    No Known Allergies    Intolerances        REVIEW OF SYSTEMS:  CONSTITUTIONAL: No fever, weight loss, or fatigue  EYES: No eye pain, visual disturbances, or discharge  ENMT:  No difficulty hearing, tinnitus, vertigo; No sinus or throat pain  NECK: No pain or stiffness  RESPIRATORY: No cough, wheezing, chills or hemoptysis; No shortness of breath  CARDIOVASCULAR: No chest pain, palpitations, dizziness, or leg swelling  GASTROINTESTINAL: No abdominal or epigastric pain. No nausea, vomiting, or hematemesis; No diarrhea or constipation. No melena or hematochezia.  GENITOURINARY: No dysuria, frequency, hematuria, or incontinence  NEUROLOGICAL: No headaches, memory loss, loss of strength, numbness, or tremors  SKIN: No itching, burning, rashes, or lesions   LYMPH NODES: No enlarged glands  ENDOCRINE: No heat or cold intolerance; No hair loss; No polydipsia or polyuria  MUSCULOSKELETAL: No joint pain or swelling; No muscle, back, or extremity pain  PSYCHIATRIC: No depression, anxiety, mood swings, or difficulty sleeping  HEME/LYMPH: No easy bruising, or bleeding gums  ALLERGY AND IMMUNOLOGIC: No hives or eczema    Vital Signs Last 24 Hrs  T(C): 36.7 (01 Sep 2017 14:41), Max: 37.3 (31 Aug 2017 20:11)  T(F): 98 (01 Sep 2017 14:41), Max: 99.1 (31 Aug 2017 20:11)  HR: 92 (01 Sep 2017 14:41) (69 - 92)  BP: 149/83 (01 Sep 2017 14:41) (120/70 - 149/83)  BP(mean): --  RR: 16 (01 Sep 2017 14:41) (16 - 19)  SpO2: 98% (01 Sep 2017 14:41) (94% - 98%)    PHYSICAL EXAM:  GENERAL: NAD, well-groomed, well-developed  HEAD:  Atraumatic, Normocephalic  EYES: EOMI, PERRLA, conjunctiva and sclera clear  ENMT: No tonsillar erythema, exudates, or enlargement; Moist mucous membranes, Good dentition, No lesions  NECK: Supple, No JVD, Normal thyroid  NERVOUS SYSTEM:  Alert & Oriented X3, Good concentration; Motor Strength 5/5 B/L upper and lower extremities; DTRs 2+ intact and symmetric  CHEST/LUNG: Clear to auscultation bilaterally; No rales, rhonchi, wheezing, or rubs  HEART: Regular rate and rhythm; No murmurs, rubs, or gallops  ABDOMEN: Soft, Nontender, Nondistended; Bowel sounds present  EXTREMITIES:  2+ Peripheral Pulses, No clubbing, cyanosis, or edema  LYMPH: No lymphadenopathy noted  SKIN: No rashes or lesions    LABS:              CAPILLARY BLOOD GLUCOSE  213 (01 Sep 2017 16:34)  179 (01 Sep 2017 07:42)  240 (31 Aug 2017 20:51)          RADIOLOGY & ADDITIONAL TESTS:    Notes Reviewed:  [x ] YES  [ ] NO    Care Discussed with Consultants/Other Providers [ x] YES  [ ] NO

## 2017-09-01 NOTE — PROGRESS NOTE ADULT - PROBLEM SELECTOR PLAN 1
cellulitis of back  check cultures  iv abx per ID Dr. VELEZ    id eval with Dr. REYES / ROSIE appreciated  monitor and trend lactate  trend labs and temp

## 2017-09-01 NOTE — CONSULT NOTE ADULT - ASSESSMENT
A/P: 72F with C3-4 facet osteomyelitis and suboccipital abscess with no cord compression  pain control  DVT prophylaxis while admitted  WBAT  Cervical brace  Medical management  IV antibiotics per ID  All imaging reviewed and discussed with patient  Will discuss with Dr. Kwon any further recommendations A/P: 72F with C3-4 facet osteomyelitis and suboccipital abscess with no cord compression  pain control  DVT prophylaxis while admitted  WBAT  Soft cervical brace for comfort as needed  Medical management  All imaging reviewed and discussed with patient  IV antibiotics per ID  Recommend IR drainage of abscess  Discussed with patient no orthopedic surgical intervention needed at this time  Discussed with Dr. Kwon and agrees with plan above  Ortho stable

## 2017-09-02 DIAGNOSIS — L02.91 CUTANEOUS ABSCESS, UNSPECIFIED: ICD-10-CM

## 2017-09-02 RX ADMIN — Medication 4: at 17:29

## 2017-09-02 RX ADMIN — OXYCODONE HYDROCHLORIDE 5 MILLIGRAM(S): 5 TABLET ORAL at 22:04

## 2017-09-02 RX ADMIN — Medication 100 MILLIGRAM(S): at 13:49

## 2017-09-02 RX ADMIN — Medication 100 MILLIGRAM(S): at 21:35

## 2017-09-02 RX ADMIN — LIDOCAINE 1 PATCH: 4 CREAM TOPICAL at 00:00

## 2017-09-02 RX ADMIN — Medication 81 MILLIGRAM(S): at 12:02

## 2017-09-02 RX ADMIN — Medication 2: at 08:13

## 2017-09-02 RX ADMIN — Medication 4: at 12:02

## 2017-09-02 RX ADMIN — GABAPENTIN 100 MILLIGRAM(S): 400 CAPSULE ORAL at 06:04

## 2017-09-02 RX ADMIN — LIDOCAINE 1 PATCH: 4 CREAM TOPICAL at 12:07

## 2017-09-02 RX ADMIN — ENOXAPARIN SODIUM 40 MILLIGRAM(S): 100 INJECTION SUBCUTANEOUS at 12:02

## 2017-09-02 RX ADMIN — GABAPENTIN 100 MILLIGRAM(S): 400 CAPSULE ORAL at 13:48

## 2017-09-02 RX ADMIN — SIMVASTATIN 20 MILLIGRAM(S): 20 TABLET, FILM COATED ORAL at 21:35

## 2017-09-02 RX ADMIN — OXYCODONE HYDROCHLORIDE 5 MILLIGRAM(S): 5 TABLET ORAL at 21:35

## 2017-09-02 RX ADMIN — GABAPENTIN 100 MILLIGRAM(S): 400 CAPSULE ORAL at 21:35

## 2017-09-02 RX ADMIN — Medication 100 MILLIGRAM(S): at 06:04

## 2017-09-02 RX ADMIN — LISINOPRIL 20 MILLIGRAM(S): 2.5 TABLET ORAL at 06:04

## 2017-09-02 NOTE — PROGRESS NOTE ADULT - PROBLEM SELECTOR PLAN 1
cellulitis of back  check final cultures  iv abx per ID Dr. VELEZ    id eval with Dr. REYES / ROSIE appreciated  monitor and trend lactate  trend labs and temp

## 2017-09-02 NOTE — CHART NOTE - NSCHARTNOTEFT_GEN_A_CORE
Do you have Advance Directives (HCP / LV / Organ donation / Documentation of oral advance Directive):   (    )  yes    ( x     )    NO                                                                            Do you have LV - Living will :                                                                                                                                             (    )  yes    (   x   )   No    Do you have HCP - Health Care Proxy:                                                                                                                            (     )  yes   (    x   ) N0    Do you have DNR- Do Not Resuscitate :                                                                                                                           (      )  yes  (   x     )  No    Do you have DNI- Do Not intubate  :                                                                                                                               (      )  yes   (   x    ) No    Do you have MOLST - Medical orders for Life sustaining treatment  :                                                                    (      ) yes    ( x      ) No    Decision Maker :  ( x    ) Patient     (      )  [facility]CA   (     ) Public Health Law Surrogate     (      ) Surrogate  (       ) Guardian    Goals of Care :  (  x    )   Complete Care     (       ) No Limitations                              (       )   Comfort Care       (       )  Hospice                               (      )   Limited medical Intervention / s    Medical Interventions :   (  x      )   CPR       (        )  DNR                                               (   x     )  Intubation with MV - Mechanical Ventilation  (  x    ) BIPAP/CPAP    (         )   DNI                                               (    x     )  Artificial Nutrition -  IVF, TPN / PPN, Tube Feeds             (         )   No Feeding Tube                                                (   x     ) Use Antibiotics                         (          ) No Antibiotics                                                (    x     ) Blood and Blood Products     (         )   No Blood or Blood products                                                (     x     )  Dialysis                                    (         )  No Dialysis                                                (     x     )  Medical Management only  (         )  No Invasive Interventions or Surgery  Time spent :                        (   x    ) up to 30 minutes                       (           )   more than 30 minutes  Goals of care by palliative Rn reviewed and discussed with patient

## 2017-09-02 NOTE — PROGRESS NOTE ADULT - SUBJECTIVE AND OBJECTIVE BOX
Neurology Follow up note    SAMIR [id number]    HPI:  pt presents with worsening back pain that started in lower back and now going up to the shoulders, also complains of back feeling very hot. no recent sun exposure, back has always been covered with shirt, no fever or chills but has been taking tylenol for pain. seen by her endocrinologist today for dm management and advised to come to er as her bp was elevated and she was tachycardic. (28 Aug 2017 17:09)      Interval History - neck pain better.    Patient is seen, chart was reviewed and case was discussed with the treatment team.  Pt is not in any distress.   Lying on bed comfortably.   No events reported overnight.   No clinical seizure was reported.  Sitting on chair bed comfortably.    is at bedside.    Vital Signs Last 24 Hrs  T(C): 36.6 (02 Sep 2017 05:57), Max: 37.1 (01 Sep 2017 20:06)  T(F): 97.8 (02 Sep 2017 05:57), Max: 98.8 (01 Sep 2017 20:06)  HR: 73 (02 Sep 2017 05:57) (73 - 92)  BP: 114/73 (02 Sep 2017 05:57) (114/73 - 149/83)  BP(mean): --  RR: 18 (02 Sep 2017 05:57) (16 - 18)  SpO2: 97% (02 Sep 2017 05:57) (97% - 98%)        REVIEW OF SYSTEMS:    Constitutional: No fever, weight loss or fatigue  Eyes: No eye pain, visual disturbances, or discharge  ENT:  No difficulty hearing, tinnitus, vertigo; No sinus or throat pain  Neck: No pain or stiffness  Respiratory: No cough, wheezing, chills or hemoptysis  Cardiovascular: No chest pain, palpitations, shortness of breath, dizziness or leg swelling  Gastrointestinal: No abdominal or epigastric pain. No nausea, vomiting or hematemesis; No diarrhea or constipation. No melena or hematochezia.  Genitourinary: No dysuria, frequency, hematuria or incontinence  Neurological: No headaches, memory loss, loss of strength, numbness or tremors  Psychiatric: No depression, anxiety, mood swings or difficulty sleeping  Musculoskeletal: No joint pain or swelling; No muscle, back or extremity pain  Skin: No itching, burning, rashes or lesions   Lymph Nodes: No enlarged glands  Endocrine: No heat or cold intolerance; No hair loss, No h/o diabetes or thyroid dysfunction  Allergy and Immunologic: No hives or eczema    On Neurological Examination:    Mental Status - Pt is alert, awake, oriented X3. . Follows commands well and able to answer questions appropriately.Mood and affect  normal    Speech -  Normal.    Cranial Nerves - Pupils 3 mm equal and reactive to light, extraocular eye movements intact. Pt has no visual field deficit.  Pt has no facial asymmetry. Facial sensation is intact Tongue - is in midline.    Muscle tone - is normal all over. Moves all extremities equally. No asymmetry is seen.      Motor Exam - 5/5 all over, No drift. No shaking or tremors.    Sensory Exam . Pt withdraws all extremities equally on stimulation. No asymmetry seen. No complaints of tingling, numbness.    Gait - Able to stand and walk unassisted.          coordination:    Finger to nose: normal.      Deep tendon Reflexes - 2 plus all over.        Romberg - Negative.    Neck Supple -  Yes.     MEDICATIONS    aspirin enteric coated 81 milliGRAM(s) Oral daily  simvastatin 20 milliGRAM(s) Oral at bedtime  lisinopril 20 milliGRAM(s) Oral daily  enoxaparin Injectable 40 milliGRAM(s) SubCutaneous daily  insulin lispro (HumaLOG) corrective regimen sliding scale   SubCutaneous three times a day before meals  dextrose 5%. 1000 milliLiter(s) IV Continuous <Continuous>  dextrose Gel 1 Dose(s) Oral once PRN  dextrose 50% Injectable 12.5 Gram(s) IV Push once  dextrose 50% Injectable 25 Gram(s) IV Push once  dextrose 50% Injectable 25 Gram(s) IV Push once  glucagon  Injectable 1 milliGRAM(s) IntraMuscular once PRN  ondansetron Injectable 4 milliGRAM(s) IV Push every 6 hours PRN  acetaminophen   Tablet 650 milliGRAM(s) Oral every 6 hours PRN  ceFAZolin   IVPB   IV Intermittent   ceFAZolin   IVPB 2000 milliGRAM(s) IV Intermittent every 8 hours  traMADol 25 milliGRAM(s) Oral four times a day PRN  oxyCODONE    IR 5 milliGRAM(s) Oral four times a day PRN  morphine  - Injectable 2 milliGRAM(s) IV Push every 6 hours PRN  gabapentin 100 milliGRAM(s) Oral three times a day  lidocaine   Patch 1 Patch Transdermal daily      Allergies    No Known Allergies    Intolerances        LABS:            Hemoglobin A1C:     Vitamin B12     RADIOLOGY.    < from: MRI Orbit, Face, and/or Neck w/wo Cont, Bilat (09.01.17 @ 14:27) >    EXAM:  MRI ORBIT FACE & NECK [facility]                            PROCEDURE DATE:  09/01/2017          INTERPRETATION:  MRI of neck with and without contrast  History MRSA sepsis, cellulitis  Contrast Gadavist 10 cc    There is abnormal marrow signalin the facets at C3-4 on the right. There   is surrounding soft tissue edema. There is a fluid collection tracking   dorsally and cranially from this location. It measures roughly 3 cm in   long axis dimension and 1.5 cm in thickness. There is no spinal canal   involvement. There is diffuse surrounding poorly defined muscular   enhancement over an area measuring roughly 5 cm in craniocaudad length.   There is no significant left-sided involvement. Involvement  does not   extend past the trapeziusor into the posterior triangle.    There is underlying lower cervical kyphosis and spondylosis without   significant spinal cord impingement.. Marrow infiltration sparing of the   vertebral column. The right vertebral artery flow-void is preserved   through and above the affected area.    IMPRESSION:  Findings most consistent with osteomyelitis of the right C3-4 facets with   associated suboccipital abscess and associated edema and enhancement   consistent with cellulitis. CT of the cervical spine may be warranted to   evaluate  the degree of osseous destruction.                PIPPA KU M.D., ATTENDING RADIOLOGIST  This document has been electronically signed. Sep  1 2017  3:17PM             ASSESSMENT AND PLAN:      cervical osteomyelitis with cellulitis,  lumbar radiculopathy.        antibiotic as ID.  orthopedic follow up noted ,advised IR drainage.  Physical therapy evaluation.  Would continue to follow.

## 2017-09-02 NOTE — PROGRESS NOTE ADULT - SUBJECTIVE AND OBJECTIVE BOX
Covering Kamari Gracia and Rich CAREY, SAMIR is a 72yFemale , patient examined and chart reviewed. Patient seen and examined today being followed for       INTERVAL HPI/ OVERNIGHT EVENTS:   Neck pain better.  Afebrile.    PAST MEDICAL & SURGICAL HISTORY:  Scoliosis  Basal cell cancer: skin  OA (osteoarthritis)  Hypertension  Elevated cholesterol  Diabetes mellitus  H/O arthroscopic knee surgery: left meniscus-- 25 years ago  H/O: hysterectomy    For details regarding the patient's social history, family history, and other miscellaneous elements, please refer the initial infectious diseases consultation and/or the admitting history and physical examination for this admission.      ROS:  CONSTITUTIONAL:  Negative fever or chills +neck pain  EYES:  Negative  blurry vision or double vision  CARDIOVASCULAR:  Negative for chest pain or palpitations  RESPIRATORY:  Negative for cough, wheezing, or SOB   GASTROINTESTINAL:  Negative for nausea, vomiting, diarrhea, constipation, or abdominal pain  GENITOURINARY:  Negative frequency, urgency or dysuria  NEUROLOGIC:  No headache, confusion, dizziness, lightheadedness  All other systems were reviewed and are negative       Current inpatient medications :    ANTIBIOTICS/RELEVANT:  ceFAZolin   IVPB   IV Intermittent   ceFAZolin   IVPB 2000 milliGRAM(s) IV Intermittent every 8 hours      simvastatin 20 milliGRAM(s) Oral at bedtime  lisinopril 20 milliGRAM(s) Oral daily  enoxaparin Injectable 40 milliGRAM(s) SubCutaneous daily  insulin lispro (HumaLOG) corrective regimen sliding scale   SubCutaneous three times a day before meals  dextrose 5%. 1000 milliLiter(s) IV Continuous <Continuous>  dextrose Gel 1 Dose(s) Oral once PRN  dextrose 50% Injectable 12.5 Gram(s) IV Push once  dextrose 50% Injectable 25 Gram(s) IV Push once  dextrose 50% Injectable 25 Gram(s) IV Push once  glucagon  Injectable 1 milliGRAM(s) IntraMuscular once PRN  ondansetron Injectable 4 milliGRAM(s) IV Push every 6 hours PRN  acetaminophen   Tablet 650 milliGRAM(s) Oral every 6 hours PRN  traMADol 25 milliGRAM(s) Oral four times a day PRN  oxyCODONE    IR 5 milliGRAM(s) Oral four times a day PRN  morphine  - Injectable 2 milliGRAM(s) IV Push every 6 hours PRN  gabapentin 100 milliGRAM(s) Oral three times a day  lidocaine   Patch 1 Patch Transdermal daily      Objective:    09-01 @ 07:01  -  09-02 @ 07:00  --------------------------------------------------------  IN: 50 mL / OUT: 0 mL / NET: 50 mL    09-02 @ 07:01  -  09-02 @ 20:36  --------------------------------------------------------  IN: 650 mL / OUT: 0 mL / NET: 650 mL      T(C): 37.1 (09-02-17 @ 20:25), Max: 37.1 (09-02-17 @ 20:25)  HR: 78 (09-02-17 @ 20:25) (73 - 86)  BP: 118/70 (09-02-17 @ 20:25) (111/68 - 118/70)  RR: 17 (09-02-17 @ 20:25) (17 - 18)  SpO2: 99% (09-02-17 @ 20:25) (96% - 99%)  Wt(kg): --      Physical Exam:  General: well developed well nourished, in no acute distress  Eyes: sclera anicteric, pupils equal and reactive to light  ENMT: buccal mucosa moist, pharynx not injected  Neck: supple, trachea midline   Lungs: clear, no wheeze/rhonchi  Cardiovascular: regular rate and rhythm, S1 S2  Abdomen: soft, nontender, no organomegaly present, bowel sounds normal  Neurological: alert and oriented x3, Cranial Nerves II-XII grossly intact  Skin: no increased ecchymosis/petechiae/purpura  Lymph Nodes: no palpable cervical/supraclavicular lymph nodes enlargements  Extremities: no cyanosis/clubbing/edema      LABS:  Sedimentation Rate, Erythrocyte (08.31.17 @ 06:28)    Sedimentation Rate, Erythrocyte: 101 mm/hr    RECENT CULTURES:  Culture - Blood in AM (09.01.17 @ 08:54)    Specimen Source: .Blood Blood-Venous    Culture Results:   No growth to date.    Culture - Blood (08.30.17 @ 15:38)    Gram Stain:   Growth in aerobic bottle: Gram Positive Cocci in Clusters    Specimen Source: .Blood Blood    Culture Results:   Growth in aerobic bottle: Gram Positive Cocci in Clusters    Culture - Blood (08.29.17 @ 12:57)    Gram Stain:   Growth in aerobic and anaerobic bottles: Gram Positive Cocci in Clusters    Specimen Source: .Blood Blood    Culture Results:   Growth in aerobic and anaerobic bottles: Staphylococcus aureus  See previous culture [id number]    Culture - Blood (08.28.17 @ 20:14)    -  Multidrug (KPC pos) resistant organism: Nondet    -  Pseudomonas aeruginosa: Nondet    -  Staphylococcus aureus: Detec Any isolate of Staphylococcus aureus from a blood culture is NOT considered a contaminant.    -  Streptococcus pyogenes (Group A): Nondet    -  Streptococcus sp. (Not Grp A, B or S pneumoniae): Nondet    -  Acinetobacter baumanii: Nondet    -  Candida tropicalis: Nondet    -  Cefazolin: S <=4    -  Clindamycin: S 0.5    -  Coagulase negative Staphylococcus: Nondet    -  Enterobacter cloacae complex: Nondet    -  Enterococcus species: Nondet    -  Klebsiella pneumoniae: Nondet    -  Moxifloxacin(Aerobic): S <=0.5    -  Neisseria meningitidis: Nondet    -  Oxacillin: S <=0.25    -  RIF- Rifampin: S <=1    -  Streptococcus agalactiae (Group B): Nondet    -  Trimethoprim/Sulfamethoxazole: S <=0.5/9.5    -  Vancomycin: S 2    -  Candida parapsilosis: Nondet    -  Ciprofloxacin: S <=1    -  Erythromycin: S 0.5    -  Escherichia coli: Nondet    -  Gentamicin: S <=1    -  Haemophilus influenzae: Nondet    -  Methicillin resistant Staphylococcus aureus (MRSA): Nondet    -  Proteus species: Nondet    -  Serratia marcescens: Nondet    -  Tetra/Doxy: S <=1    Gram Stain:   Growth in aerobic bottle: Gram Positive Cocci in Clusters  Growth in anaerobic bottle: Gram Positive Cocci in Clusters    -  Ampicillin/Sulbactam: S <=8/4    -  Candida albicans: Nondet    -  Candida glabrata: Nondet    -  Candida krusei: Nondet    -  Klebsiella oxytoca: Nondet    -  Levofloxacin: S <=0.5    -  Listeria monocytogenes: Nondet    -  Streptococcus pneumoniae: Nondet    -  Vancomycin resistant Enterococcus sp.: Nondet    Specimen Source: .Blood Blood-Peripheral    Organism: Staphylococcus aureus    Organism: Blood Culture PCR    Culture Results:   Growth in aerobic and anaerobic bottles: Staphylococcus aureus  ***Blood Panel PCR results on this specimen are available  approximately 3 hours after the Gram stain result.***  Gram stain, PCR, and/or culture results may not always  correspond dueto difference in methodologies.    Organism Identification: Blood Culture PCR  Staphylococcus aureus    Method Type: MARYLOU    Method Type: PCR      RADIOLOGY & ADDITIONAL STUDIES:  EXAM:  MRI ORBIT FACE & NECK [facility] O C                            PROCEDURE DATE:  09/01/2017          INTERPRETATION:  MRI of neck with and without contrast  History MRSA sepsis, cellulitis  Contrast Gadavist 10 cc    There is abnormal marrow signalin the facets at C3-4 on the right. There   is surrounding soft tissue edema. There is a fluid collection tracking   dorsally and cranially from this location. It measures roughly 3 cm in   long axis dimension and 1.5 cm in thickness. There is no spinal canal   involvement. There is diffuse surrounding poorly defined muscular   enhancement over an area measuring roughly 5 cm in craniocaudad length.   There is no significant left-sided involvement. Involvement  does not   extend past the trapeziusor into the posterior triangle.    There is underlying lower cervical kyphosis and spondylosis without   significant spinal cord impingement.. Marrow infiltration sparing of the   vertebral column. The right vertebral artery flow-void is preserved   through and above the affected area.    IMPRESSION:  Findings most consistent with osteomyelitis of the right C3-4 facets with   associated suboccipital abscess and associated edema and enhancement   consistent with cellulitis. CT of the cervical spine may be warranted to   evaluate  the degree of osseous destruction.            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 She expressed understanding of the  treatment plan . Risks, benefits and alternatives discussed in detail.   I have asked if she has any questions or concerns and appropriately addressed them to the best of my ability .    > 35 minutes were spent in direct patient care reviewing notes, medications ,labs data/ imaging , discussion with multidisciplinary team.    Thank you for allowing me to participate in care of your patient .    Michaela Adam MD  Infectious Disease  [phone] Covering Kamari Gracia and Rich CAREY, SAMIR is a 72yFemale , patient examined and chart reviewed. Patient seen and examined today being followed for       INTERVAL HPI/ OVERNIGHT EVENTS:   Neck pain better.  Afebrile. PICC line placed.    PAST MEDICAL & SURGICAL HISTORY:  Scoliosis  Basal cell cancer: skin  OA (osteoarthritis)  Hypertension  Elevated cholesterol  Diabetes mellitus  H/O arthroscopic knee surgery: left meniscus-- 25 years ago  H/O: hysterectomy    For details regarding the patient's social history, family history, and other miscellaneous elements, please refer the initial infectious diseases consultation and/or the admitting history and physical examination for this admission.      ROS:  CONSTITUTIONAL:  Negative fever or chills +neck pain  EYES:  Negative  blurry vision or double vision  CARDIOVASCULAR:  Negative for chest pain or palpitations  RESPIRATORY:  Negative for cough, wheezing, or SOB   GASTROINTESTINAL:  Negative for nausea, vomiting, diarrhea, constipation, or abdominal pain  GENITOURINARY:  Negative frequency, urgency or dysuria  NEUROLOGIC:  No headache, confusion, dizziness, lightheadedness  All other systems were reviewed and are negative       Current inpatient medications :    ANTIBIOTICS/RELEVANT:  ceFAZolin   IVPB   IV Intermittent   ceFAZolin   IVPB 2000 milliGRAM(s) IV Intermittent every 8 hours      simvastatin 20 milliGRAM(s) Oral at bedtime  lisinopril 20 milliGRAM(s) Oral daily  enoxaparin Injectable 40 milliGRAM(s) SubCutaneous daily  insulin lispro (HumaLOG) corrective regimen sliding scale   SubCutaneous three times a day before meals  dextrose 5%. 1000 milliLiter(s) IV Continuous <Continuous>  dextrose Gel 1 Dose(s) Oral once PRN  dextrose 50% Injectable 12.5 Gram(s) IV Push once  dextrose 50% Injectable 25 Gram(s) IV Push once  dextrose 50% Injectable 25 Gram(s) IV Push once  glucagon  Injectable 1 milliGRAM(s) IntraMuscular once PRN  ondansetron Injectable 4 milliGRAM(s) IV Push every 6 hours PRN  acetaminophen   Tablet 650 milliGRAM(s) Oral every 6 hours PRN  traMADol 25 milliGRAM(s) Oral four times a day PRN  oxyCODONE    IR 5 milliGRAM(s) Oral four times a day PRN  morphine  - Injectable 2 milliGRAM(s) IV Push every 6 hours PRN  gabapentin 100 milliGRAM(s) Oral three times a day  lidocaine   Patch 1 Patch Transdermal daily      Objective:    09-01 @ 07:01  -  09-02 @ 07:00  --------------------------------------------------------  IN: 50 mL / OUT: 0 mL / NET: 50 mL    09-02 @ 07:01  -  09-02 @ 20:36  --------------------------------------------------------  IN: 650 mL / OUT: 0 mL / NET: 650 mL      T(C): 37.1 (09-02-17 @ 20:25), Max: 37.1 (09-02-17 @ 20:25)  HR: 78 (09-02-17 @ 20:25) (73 - 86)  BP: 118/70 (09-02-17 @ 20:25) (111/68 - 118/70)  RR: 17 (09-02-17 @ 20:25) (17 - 18)  SpO2: 99% (09-02-17 @ 20:25) (96% - 99%)  Wt(kg): --      Physical Exam:  General: well developed well nourished, in no acute distress  Eyes: sclera anicteric, pupils equal and reactive to light  ENMT: buccal mucosa moist, pharynx not injected  Neck: supple, trachea midline   Lungs: clear, no wheeze/rhonchi  Cardiovascular: regular rate and rhythm, S1 S2  Abdomen: soft, nontender, no organomegaly present, bowel sounds normal  Neurological: alert and oriented x3, Cranial Nerves II-XII grossly intact  Skin: no increased ecchymosis/petechiae/purpura  Lymph Nodes: no palpable cervical/supraclavicular lymph nodes enlargements  Extremities: no cyanosis/clubbing/edema      LABS:  Sedimentation Rate, Erythrocyte (08.31.17 @ 06:28)    Sedimentation Rate, Erythrocyte: 101 mm/hr    RECENT CULTURES:  Culture - Blood in AM (09.01.17 @ 08:54)    Specimen Source: .Blood Blood-Venous    Culture Results:   No growth to date.    Culture - Blood (08.30.17 @ 15:38)    Gram Stain:   Growth in aerobic bottle: Gram Positive Cocci in Clusters    Specimen Source: .Blood Blood    Culture Results:   Growth in aerobic bottle: Gram Positive Cocci in Clusters    Culture - Blood (08.29.17 @ 12:57)    Gram Stain:   Growth in aerobic and anaerobic bottles: Gram Positive Cocci in Clusters    Specimen Source: .Blood Blood    Culture Results:   Growth in aerobic and anaerobic bottles: Staphylococcus aureus  See previous culture [id number]    Culture - Blood (08.28.17 @ 20:14)    -  Multidrug (KPC pos) resistant organism: Nondet    -  Pseudomonas aeruginosa: Nondet    -  Staphylococcus aureus: Detec Any isolate of Staphylococcus aureus from a blood culture is NOT considered a contaminant.    -  Streptococcus pyogenes (Group A): Nondet    -  Streptococcus sp. (Not Grp A, B or S pneumoniae): Nondet    -  Acinetobacter baumanii: Nondet    -  Candida tropicalis: Nondet    -  Cefazolin: S <=4    -  Clindamycin: S 0.5    -  Coagulase negative Staphylococcus: Nondet    -  Enterobacter cloacae complex: Nondet    -  Enterococcus species: Nondet    -  Klebsiella pneumoniae: Nondet    -  Moxifloxacin(Aerobic): S <=0.5    -  Neisseria meningitidis: Nondet    -  Oxacillin: S <=0.25    -  RIF- Rifampin: S <=1    -  Streptococcus agalactiae (Group B): Nondet    -  Trimethoprim/Sulfamethoxazole: S <=0.5/9.5    -  Vancomycin: S 2    -  Candida parapsilosis: Nondet    -  Ciprofloxacin: S <=1    -  Erythromycin: S 0.5    -  Escherichia coli: Nondet    -  Gentamicin: S <=1    -  Haemophilus influenzae: Nondet    -  Methicillin resistant Staphylococcus aureus (MRSA): Nondet    -  Proteus species: Nondet    -  Serratia marcescens: Nondet    -  Tetra/Doxy: S <=1    Gram Stain:   Growth in aerobic bottle: Gram Positive Cocci in Clusters  Growth in anaerobic bottle: Gram Positive Cocci in Clusters    -  Ampicillin/Sulbactam: S <=8/4    -  Candida albicans: Nondet    -  Candida glabrata: Nondet    -  Candida krusei: Nondet    -  Klebsiella oxytoca: Nondet    -  Levofloxacin: S <=0.5    -  Listeria monocytogenes: Nondet    -  Streptococcus pneumoniae: Nondet    -  Vancomycin resistant Enterococcus sp.: Nondet    Specimen Source: .Blood Blood-Peripheral    Organism: Staphylococcus aureus    Organism: Blood Culture PCR    Culture Results:   Growth in aerobic and anaerobic bottles: Staphylococcus aureus  ***Blood Panel PCR results on this specimen are available  approximately 3 hours after the Gram stain result.***  Gram stain, PCR, and/or culture results may not always  correspond dueto difference in methodologies.    Organism Identification: Blood Culture PCR  Staphylococcus aureus    Method Type: MARYLOU    Method Type: PCR      RADIOLOGY & ADDITIONAL STUDIES:  EXAM:  MRI ORBIT FACE & NECK [facility]                            PROCEDURE DATE:  09/01/2017          INTERPRETATION:  MRI of neck with and without contrast  History MRSA sepsis, cellulitis  Contrast Gadavist 10 cc    There is abnormal marrow signalin the facets at C3-4 on the right. There   is surrounding soft tissue edema. There is a fluid collection tracking   dorsally and cranially from this location. It measures roughly 3 cm in   long axis dimension and 1.5 cm in thickness. There is no spinal canal   involvement. There is diffuse surrounding poorly defined muscular   enhancement over an area measuring roughly 5 cm in craniocaudad length.   There is no significant left-sided involvement. Involvement  does not   extend past the trapeziusor into the posterior triangle.    There is underlying lower cervical kyphosis and spondylosis without   significant spinal cord impingement.. Marrow infiltration sparing of the   vertebral column. The right vertebral artery flow-void is preserved   through and above the affected area.    IMPRESSION:  Findings most consistent with osteomyelitis of the right C3-4 facets with   associated suboccipital abscess and associated edema and enhancement   consistent with cellulitis. CT of the cervical spine may be warranted to   evaluate  the degree of osseous destruction.        ECHO:  Technically difficult study  Mitral Valve: Mildly calcified mitral annulus and with normal appear MV   leaflets. Mild mitral regurgitation. No gross vegetations appreciated  Aortic Valve/Aorta: Normal trileaflet aortic valve. No gross vegetations   appreciated  Tricuspid Valve: Normal tricuspid valve. Trace tricuspid regurgitation.   No gross vegetations appreciated.  Pulmonic Valve: The pulmonic valve is not well visualized. Probably   normal.  Left Atrium: Mildly enlarged  Right Atrium: Normal  Left Ventricle: Overall preserved left ventricular function. No gross   wall motion abnormalities noted.. The EF is approximately 65-70%.  Right Ventricle: Normal right ventricular size andfunction.  Pericardium/Pleura: No pericardial effusion noted.  Pulmonary/RV Pressure: The right ventricular systolic pressure is   estimated to be 24mmHg, assuming that the right atrial pressure is   estimated to be 8 mmHg. This is consistent with normal pulmonary   pressures.  LV Diastolic Function: E to a reversal consistent with reduced left   ventricular compliance.    Conclusion: Overall preserved left ventricular function. No gross   valvular abnormalities are noted. No gross vegetations were noted. If   clinically warranted a TADEO can be pursued to rule out endocarditis.              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 She expressed understanding of the  treatment plan . Risks, benefits and alternatives discussed in detail.   I have asked if she has any questions or concerns and appropriately addressed them to the best of my ability .    > 35 minutes were spent in direct patient care reviewing notes, medications ,labs data/ imaging , discussion with multidisciplinary team.    Thank you for allowing me to participate in care of your patient .    Michaela Adam MD  Infectious Disease  [phone]

## 2017-09-02 NOTE — PROGRESS NOTE ADULT - SUBJECTIVE AND OBJECTIVE BOX
Pt seen & examined. Pain controlled. No acute events overnight. Patient states neck pain and range of motion have been improving since starting the antibiotics.    All vital signs stable    Gen: NAD  Spine:  Skin intact  Sensation intact to light touch in C5-T1 and L2-S1 nerve distributions bilaterally  Radial/Dorsalis pedis/Posterior tibial pulses 2+  Compartments soft and compressible    Motor:    Right Upper Extremity      Left Upper Extremity                     C5: 5/5                               C5: 5/5               C6: 5/5                               C6: 5/5               C7: 5/5                               C7: 5/5               C8: 5/5                               C8: 5/5               T1: 5/5                               T1: 5/5       Right Lower Extremity      Left Lower Extremity               L2: 5/5                               L2: 5/5               L3: 5/5                               L3: 5/5               L4: 5/5                               L4: 5/5               L5: 5/5                               L5: 5/5               S1: 5/5                              S1: 5/5

## 2017-09-02 NOTE — PROGRESS NOTE ADULT - SUBJECTIVE AND OBJECTIVE BOX
Patient is a 72y old  Female who presents with a chief complaint of back pain and hottness (28 Aug 2017 17:09)      INTERVAL /OVERNIGHT EVENTS: neck pain better    MEDICATIONS  (STANDING):  aspirin enteric coated 81 milliGRAM(s) Oral daily  simvastatin 20 milliGRAM(s) Oral at bedtime  lisinopril 20 milliGRAM(s) Oral daily  enoxaparin Injectable 40 milliGRAM(s) SubCutaneous daily  insulin lispro (HumaLOG) corrective regimen sliding scale   SubCutaneous three times a day before meals  dextrose 5%. 1000 milliLiter(s) (50 mL/Hr) IV Continuous <Continuous>  dextrose 50% Injectable 12.5 Gram(s) IV Push once  dextrose 50% Injectable 25 Gram(s) IV Push once  dextrose 50% Injectable 25 Gram(s) IV Push once  ceFAZolin   IVPB   IV Intermittent   ceFAZolin   IVPB 2000 milliGRAM(s) IV Intermittent every 8 hours  gabapentin 100 milliGRAM(s) Oral three times a day  lidocaine   Patch 1 Patch Transdermal daily    MEDICATIONS  (PRN):  dextrose Gel 1 Dose(s) Oral once PRN Blood Glucose LESS THAN 70 milliGRAM(s)/deciliter  glucagon  Injectable 1 milliGRAM(s) IntraMuscular once PRN Glucose LESS THAN 70 milligrams/deciliter  ondansetron Injectable 4 milliGRAM(s) IV Push every 6 hours PRN Nausea and/or Vomiting  acetaminophen   Tablet 650 milliGRAM(s) Oral every 6 hours PRN For Temp greater than 38 C (100.4 F)  traMADol 25 milliGRAM(s) Oral four times a day PRN Mild Pain (1 - 3)  oxyCODONE    IR 5 milliGRAM(s) Oral four times a day PRN Moderate Pain (4 - 6)  morphine  - Injectable 2 milliGRAM(s) IV Push every 6 hours PRN Severe Pain (7 - 10)      Allergies    No Known Allergies    Intolerances        REVIEW OF SYSTEMS:  CONSTITUTIONAL: No fever, weight loss, or fatigue  EYES: No eye pain, visual disturbances, or discharge  ENMT:  No difficulty hearing, tinnitus, vertigo; No sinus or throat pain  NECK: No pain or stiffness  RESPIRATORY: No cough, wheezing, chills or hemoptysis; No shortness of breath  CARDIOVASCULAR: No chest pain, palpitations, dizziness, or leg swelling  GASTROINTESTINAL: No abdominal or epigastric pain. No nausea, vomiting, or hematemesis; No diarrhea or constipation. No melena or hematochezia.  GENITOURINARY: No dysuria, frequency, hematuria, or incontinence  NEUROLOGICAL: No headaches, memory loss, loss of strength, numbness, or tremors  SKIN: No itching, burning, rashes, or lesions   LYMPH NODES: No enlarged glands  ENDOCRINE: No heat or cold intolerance; No hair loss; No polydipsia or polyuria  MUSCULOSKELETAL: No joint pain or swelling; No muscle, back, or extremity pain  PSYCHIATRIC: No depression, anxiety, mood swings, or difficulty sleeping  HEME/LYMPH: No easy bruising, or bleeding gums  ALLERGY AND IMMUNOLOGIC: No hives or eczema    Vital Signs Last 24 Hrs  T(C): 36.9 (02 Sep 2017 14:43), Max: 37.1 (01 Sep 2017 20:06)  T(F): 98.5 (02 Sep 2017 14:43), Max: 98.8 (01 Sep 2017 20:06)  HR: 86 (02 Sep 2017 14:43) (73 - 86)  BP: 111/68 (02 Sep 2017 14:43) (111/68 - 119/67)  BP(mean): --  RR: 18 (02 Sep 2017 14:43) (17 - 18)  SpO2: 96% (02 Sep 2017 14:43) (96% - 98%)    PHYSICAL EXAM:  GENERAL: NAD, well-groomed, well-developed  HEAD:  Atraumatic, Normocephalic  EYES: EOMI, PERRLA, conjunctiva and sclera clear  ENMT: No tonsillar erythema, exudates, or enlargement; Moist mucous membranes, Good dentition, No lesions  NECK: Supple, No JVD, Normal thyroid  NERVOUS SYSTEM:  Alert & Oriented X3, Good concentration; Motor Strength 5/5 B/L upper and lower extremities; DTRs 2+ intact and symmetric  CHEST/LUNG: Clear to auscultation bilaterally; No rales, rhonchi, wheezing, or rubs  HEART: Regular rate and rhythm; No murmurs, rubs, or gallops  ABDOMEN: Soft, Nontender, Nondistended; Bowel sounds present  EXTREMITIES:  2+ Peripheral Pulses, No clubbing, cyanosis, or edema  LYMPH: No lymphadenopathy noted  SKIN: No rashes or lesions    LABS:              CAPILLARY BLOOD GLUCOSE  250 (02 Sep 2017 11:58)  197 (02 Sep 2017 07:59)  220 (01 Sep 2017 21:46)  213 (01 Sep 2017 16:34)          RADIOLOGY & ADDITIONAL TESTS:    Notes Reviewed:  [x ] YES  [ ] NO    Care Discussed with Consultants/Other Providers [x ] YES  [ ] NO

## 2017-09-03 DIAGNOSIS — M62.89 OTHER SPECIFIED DISORDERS OF MUSCLE: ICD-10-CM

## 2017-09-03 LAB
CULTURE RESULTS: SIGNIFICANT CHANGE UP
SPECIMEN SOURCE: SIGNIFICANT CHANGE UP

## 2017-09-03 RX ADMIN — ENOXAPARIN SODIUM 40 MILLIGRAM(S): 100 INJECTION SUBCUTANEOUS at 12:14

## 2017-09-03 RX ADMIN — LIDOCAINE 1 PATCH: 4 CREAM TOPICAL at 12:15

## 2017-09-03 RX ADMIN — Medication 2: at 17:24

## 2017-09-03 RX ADMIN — Medication 100 MILLIGRAM(S): at 13:18

## 2017-09-03 RX ADMIN — GABAPENTIN 100 MILLIGRAM(S): 400 CAPSULE ORAL at 21:25

## 2017-09-03 RX ADMIN — GABAPENTIN 100 MILLIGRAM(S): 400 CAPSULE ORAL at 13:18

## 2017-09-03 RX ADMIN — Medication 100 MILLIGRAM(S): at 21:24

## 2017-09-03 RX ADMIN — SIMVASTATIN 20 MILLIGRAM(S): 20 TABLET, FILM COATED ORAL at 21:25

## 2017-09-03 RX ADMIN — Medication 4: at 12:14

## 2017-09-03 RX ADMIN — Medication 2: at 07:59

## 2017-09-03 RX ADMIN — GABAPENTIN 100 MILLIGRAM(S): 400 CAPSULE ORAL at 06:28

## 2017-09-03 RX ADMIN — LISINOPRIL 20 MILLIGRAM(S): 2.5 TABLET ORAL at 06:28

## 2017-09-03 RX ADMIN — Medication 100 MILLIGRAM(S): at 06:27

## 2017-09-03 NOTE — PROGRESS NOTE ADULT - SUBJECTIVE AND OBJECTIVE BOX
Covering Kamari Gracia and Rich CAREY, SAMIR is a 72yFemale , patient examined and chart reviewed. Patient seen and examined today being followed for     INTERVAL HPI/ OVERNIGHT EVENTS:   Stable. Afebrile.    PAST MEDICAL & SURGICAL HISTORY:  Scoliosis  Basal cell cancer: skin  OA (osteoarthritis)  Hypertension  Elevated cholesterol  Diabetes mellitus  H/O arthroscopic knee surgery: left meniscus-- 25 years ago  H/O: hysterectomy      For details regarding the patient's social history, family history, and other miscellaneous elements, please refer the initial infectious diseases consultation and/or the admitting history and physical examination for this admission.      ROS:  CONSTITUTIONAL:  Negative fever or chills  EYES:  Negative  blurry vision or double vision  CARDIOVASCULAR:  Negative for chest pain or palpitations  RESPIRATORY:  Negative for cough, wheezing, or SOB   GASTROINTESTINAL:  Negative for nausea, vomiting, diarrhea, constipation, or abdominal pain  GENITOURINARY:  Negative frequency, urgency or dysuria  NEUROLOGIC:  No headache, confusion, dizziness, lightheadedness  All other systems were reviewed and are negative         Current inpatient medications :    ANTIBIOTICS/RELEVANT:  ceFAZolin   IVPB   IV Intermittent   ceFAZolin   IVPB 2000 milliGRAM(s) IV Intermittent every 8 hours      simvastatin 20 milliGRAM(s) Oral at bedtime  lisinopril 20 milliGRAM(s) Oral daily  enoxaparin Injectable 40 milliGRAM(s) SubCutaneous daily  insulin lispro (HumaLOG) corrective regimen sliding scale   SubCutaneous three times a day before meals  dextrose 5%. 1000 milliLiter(s) IV Continuous <Continuous>  dextrose Gel 1 Dose(s) Oral once PRN  dextrose 50% Injectable 12.5 Gram(s) IV Push once  dextrose 50% Injectable 25 Gram(s) IV Push once  dextrose 50% Injectable 25 Gram(s) IV Push once  glucagon  Injectable 1 milliGRAM(s) IntraMuscular once PRN  ondansetron Injectable 4 milliGRAM(s) IV Push every 6 hours PRN  acetaminophen   Tablet 650 milliGRAM(s) Oral every 6 hours PRN  traMADol 25 milliGRAM(s) Oral four times a day PRN  oxyCODONE    IR 5 milliGRAM(s) Oral four times a day PRN  morphine  - Injectable 2 milliGRAM(s) IV Push every 6 hours PRN  gabapentin 100 milliGRAM(s) Oral three times a day  lidocaine   Patch 1 Patch Transdermal daily      Objective:    09-02 @ 07:01  -  09-03 @ 07:00  --------------------------------------------------------  IN: 650 mL / OUT: 0 mL / NET: 650 mL      T(C): 37.5 (09-03-17 @ 13:03), Max: 37.5 (09-03-17 @ 13:03)  HR: 91 (09-03-17 @ 13:03) (78 - 91)  BP: 142/76 (09-03-17 @ 13:03) (118/70 - 161/69)  RR: 18 (09-03-17 @ 13:03) (17 - 18)  SpO2: 95% (09-03-17 @ 13:03) (95% - 99%)  Wt(kg): --      Physical Exam:  General: No acute distress  Eyes: sclera anicteric, pupils equal and reactive to light  ENMT: buccal mucosa moist, pharynx not injected  Neck: supple, trachea midline Neck pain better  Lungs: clear, no wheeze/rhonchi  Cardiovascular: regular rate and rhythm, S1 S2  Abdomen: soft, nontender, no organomegaly present, bowel sounds normal  Neurological: alert and oriented x3, Cranial Nerves II-XII grossly intact  Extremities: no cyanosis/clubbing/edema        LABS:    Sedimentation Rate, Erythrocyte (08.31.17 @ 06:28)    Sedimentation Rate, Erythrocyte: 101 mm/hr    RECENT CULTURES:  Culture - Blood in AM (09.01.17 @ 08:54)    Specimen Source: .Blood Blood-Venous    Culture Results:   No growth to date.    Culture - Blood (08.30.17 @ 15:38)    Gram Stain:   Growth in aerobic bottle: Gram Positive Cocci in Clusters    Specimen Source: .Blood Blood    Culture Results:   Growth in aerobic bottle: Gram Positive Cocci in Clusters    Culture - Blood (08.29.17 @ 12:57)    Gram Stain:   Growth in aerobic and anaerobic bottles: Gram Positive Cocci in Clusters    Specimen Source: .Blood Blood    Culture Results:   Growth in aerobic and anaerobic bottles: Staphylococcus aureus  See previous culture [id number]    Culture - Blood (08.28.17 @ 20:14)    -  Multidrug (KPC pos) resistant organism: Nondet    -  Pseudomonas aeruginosa: Nondet    -  Staphylococcus aureus: Detec Any isolate of Staphylococcus aureus from a blood culture is NOT considered a contaminant.    -  Streptococcus pyogenes (Group A): Nondet    -  Streptococcus sp. (Not Grp A, B or S pneumoniae): Nondet    -  Acinetobacter baumanii: Nondet    -  Candida tropicalis: Nondet    -  Cefazolin: S <=4    -  Clindamycin: S 0.5    -  Coagulase negative Staphylococcus: Nondet    -  Enterobacter cloacae complex: Nondet    -  Enterococcus species: Nondet    -  Klebsiella pneumoniae: Nondet    -  Moxifloxacin(Aerobic): S <=0.5    -  Neisseria meningitidis: Nondet    -  Oxacillin: S <=0.25    -  RIF- Rifampin: S <=1    -  Streptococcus agalactiae (Group B): Nondet    -  Trimethoprim/Sulfamethoxazole: S <=0.5/9.5    -  Vancomycin: S 2    -  Candida parapsilosis: Nondet    -  Ciprofloxacin: S <=1    -  Erythromycin: S 0.5    -  Escherichia coli: Nondet    -  Gentamicin: S <=1    -  Haemophilus influenzae: Nondet    -  Methicillin resistant Staphylococcus aureus (MRSA): Nondet    -  Proteus species: Nondet    -  Serratia marcescens: Nondet    -  Tetra/Doxy: S <=1    Gram Stain:   Growth in aerobic bottle: Gram Positive Cocci in Clusters  Growth in anaerobic bottle: Gram Positive Cocci in Clusters    -  Ampicillin/Sulbactam: S <=8/4    -  Candida albicans: Nondet    -  Candida glabrata: Nondet    -  Candida krusei: Nondet    -  Klebsiella oxytoca: Nondet    -  Levofloxacin: S <=0.5    -  Listeria monocytogenes: Nondet    -  Streptococcus pneumoniae: Nondet    -  Vancomycin resistant Enterococcus sp.: Nondet    Specimen Source: .Blood Blood-Peripheral    Organism: Staphylococcus aureus    Organism: Blood Culture PCR    Culture Results:   Growth in aerobic and anaerobic bottles: Staphylococcus aureus  ***Blood Panel PCR results on this specimen are available  approximately 3 hours after the Gram stain result.***  Gram stain, PCR, and/or culture results may not always  correspond dueto difference in methodologies.    Organism Identification: Blood Culture PCR  Staphylococcus aureus    Method Type: MARYLOU    Method Type: PCR      RADIOLOGY & ADDITIONAL STUDIES:  EXAM:  MRI ORBIT FACE & NECK [facility]                            PROCEDURE DATE:  09/01/2017          INTERPRETATION:  MRI of neck with and without contrast  History MRSA sepsis, cellulitis  Contrast Gadavist 10 cc    There is abnormal marrow signalin the facets at C3-4 on the right. There   is surrounding soft tissue edema. There is a fluid collection tracking   dorsally and cranially from this location. It measures roughly 3 cm in   long axis dimension and 1.5 cm in thickness. There is no spinal canal   involvement. There is diffuse surrounding poorly defined muscular   enhancement over an area measuring roughly 5 cm in craniocaudad length.   There is no significant left-sided involvement. Involvement  does not   extend past the trapeziusor into the posterior triangle.    There is underlying lower cervical kyphosis and spondylosis without   significant spinal cord impingement.. Marrow infiltration sparing of the   vertebral column. The right vertebral artery flow-void is preserved   through and above the affected area.    IMPRESSION:  Findings most consistent with osteomyelitis of the right C3-4 facets with   associated suboccipital abscess and associated edema and enhancement   consistent with cellulitis. CT of the cervical spine may be warranted to   evaluate  the degree of osseous destruction.        ECHO:  Technically difficult study  Mitral Valve: Mildly calcified mitral annulus and with normal appear MV   leaflets. Mild mitral regurgitation. No gross vegetations appreciated  Aortic Valve/Aorta: Normal trileaflet aortic valve. No gross vegetations   appreciated  Tricuspid Valve: Normal tricuspid valve. Trace tricuspid regurgitation.   No gross vegetations appreciated.  Pulmonic Valve: The pulmonic valve is not well visualized. Probably   normal.  Left Atrium: Mildly enlarged  Right Atrium: Normal  Left Ventricle: Overall preserved left ventricular function. No gross   wall motion abnormalities noted.. The EF is approximately 65-70%.  Right Ventricle: Normal right ventricular size andfunction.  Pericardium/Pleura: No pericardial effusion noted.  Pulmonary/RV Pressure: The right ventricular systolic pressure is   estimated to be 24mmHg, assuming that the right atrial pressure is   estimated to be 8 mmHg. This is consistent with normal pulmonary   pressures.  LV Diastolic Function: E to a reversal consistent with reduced left   ventricular compliance.    Conclusion: Overall preserved left ventricular function. No gross   valvular abnormalities are noted. No gross vegetations were noted. If   clinically warranted a TADEO can be pursued to rule out endocarditis.          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 She expressed understanding of the  treatment plan . Risks, benefits and alternatives discussed in detail.   I have asked if she has any questions or concerns and appropriately addressed them to the best of my ability .    > 35 minutes were spent in direct patient care reviewing notes, medications ,labs data/ imaging , discussion with multidisciplinary team.    Dr Villanueva will resume care in am.    Thank you for allowing me to participate in care of your patient .    Michaela Adam MD  Infectious Disease  [phone]

## 2017-09-03 NOTE — PROGRESS NOTE ADULT - SUBJECTIVE AND OBJECTIVE BOX
Progress Note:   · Provider Specialty	Neurology	      · Subjective and Objective: 	  Neurology Follow up note    SAMIR [id number]    HPI:  pt presents with worsening back pain that started in lower back and now going up to the shoulders, also complains of back feeling very hot. no recent sun exposure, back has always been covered with shirt, no fever or chills but has been taking tylenol for pain. seen by her endocrinologist today for dm management and advised to come to er as her bp was elevated and she was tachycardic. (28 Aug 2017 17:09)      Interval History - recurrence of neck pain .    Patient is seen, chart was reviewed and case was discussed with the treatment team.  Pt is not in any distress.   Lying on bed comfortably.   No events reported overnight.   No clinical seizure was reported.  Sitting on chair bed comfortably.    is at bedside.    Vital Signs Last 24 Hrs  T(C): 37.5 (03 Sep 2017 13:03), Max: 37.5 (03 Sep 2017 13:03)  T(F): 99.5 (03 Sep 2017 13:03), Max: 99.5 (03 Sep 2017 13:03)  HR: 91 (03 Sep 2017 13:03) (78 - 91)  BP: 142/76 (03 Sep 2017 13:03) (118/70 - 161/69)  BP(mean): --  RR: 18 (03 Sep 2017 13:03) (17 - 18)  SpO2: 95% (03 Sep 2017 13:03) (95% - 99%))        REVIEW OF SYSTEMS:    Constitutional: No fever, weight loss or fatigue  Eyes: No eye pain, visual disturbances, or discharge  ENT:  No difficulty hearing, tinnitus, vertigo; No sinus or throat pain  Neck: No pain or stiffness  Respiratory: No cough, wheezing, chills or hemoptysis  Cardiovascular: No chest pain, palpitations, shortness of breath, dizziness or leg swelling  Gastrointestinal: No abdominal or epigastric pain. No nausea, vomiting or hematemesis; No diarrhea or constipation. No melena or hematochezia.  Genitourinary: No dysuria, frequency, hematuria or incontinence  Neurological: No headaches, memory loss, loss of strength, numbness or tremors  Psychiatric: No depression, anxiety, mood swings or difficulty sleeping  Musculoskeletal: No joint pain or swelling; No muscle, back or extremity pain  Skin: No itching, burning, rashes or lesions   Lymph Nodes: No enlarged glands  Endocrine: No heat or cold intolerance; No hair loss, No h/o diabetes or thyroid dysfunction  Allergy and Immunologic: No hives or eczema    On Neurological Examination:    Mental Status - Pt is alert, awake, oriented X3. . Follows commands well and able to answer questions appropriately.Mood and affect  normal    Speech -  Normal.    Cranial Nerves - Pupils 3 mm equal and reactive to light, extraocular eye movements intact. Pt has no visual field deficit.  Pt has no facial asymmetry. Facial sensation is intact Tongue - is in midline.    Muscle tone - is normal all over. Moves all extremities equally. No asymmetry is seen.      Motor Exam - 5/5 all over, No drift. No shaking or tremors.    Sensory Exam . Pt withdraws all extremities equally on stimulation. No asymmetry seen. No complaints of tingling, numbness.    Gait - Able to stand and walk unassisted.          coordination:    Finger to nose: normal.      Deep tendon Reflexes - 2 plus all over.        Romberg - Negative.    Neck Supple -  Yes.     MEDICATIONS    aspirin enteric coated 81 milliGRAM(s) Oral daily  simvastatin 20 milliGRAM(s) Oral at bedtime  lisinopril 20 milliGRAM(s) Oral daily  enoxaparin Injectable 40 milliGRAM(s) SubCutaneous daily  insulin lispro (HumaLOG) corrective regimen sliding scale   SubCutaneous three times a day before meals  dextrose 5%. 1000 milliLiter(s) IV Continuous <Continuous>  dextrose Gel 1 Dose(s) Oral once PRN  dextrose 50% Injectable 12.5 Gram(s) IV Push once  dextrose 50% Injectable 25 Gram(s) IV Push once  dextrose 50% Injectable 25 Gram(s) IV Push once  glucagon  Injectable 1 milliGRAM(s) IntraMuscular once PRN  ondansetron Injectable 4 milliGRAM(s) IV Push every 6 hours PRN  acetaminophen   Tablet 650 milliGRAM(s) Oral every 6 hours PRN  ceFAZolin   IVPB   IV Intermittent   ceFAZolin   IVPB 2000 milliGRAM(s) IV Intermittent every 8 hours  traMADol 25 milliGRAM(s) Oral four times a day PRN  oxyCODONE    IR 5 milliGRAM(s) Oral four times a day PRN  morphine  - Injectable 2 milliGRAM(s) IV Push every 6 hours PRN  gabapentin 100 milliGRAM(s) Oral three times a day  lidocaine   Patch 1 Patch Transdermal daily      Allergies    No Known Allergies    Intolerances        LABS:            Hemoglobin A1C:     Vitamin B12     RADIOLOGY.    < from: MRI Orbit, Face, and/or Neck w/wo Cont, Bilat (09.01.17 @ 14:27) >    EXAM:  MRI ORBIT FACE & NECK [facility]                            PROCEDURE DATE:  09/01/2017          INTERPRETATION:  MRI of neck with and without contrast  History MRSA sepsis, cellulitis  Contrast Gadavist 10 cc    There is abnormal marrow signalin the facets at C3-4 on the right. There   is surrounding soft tissue edema. There is a fluid collection tracking   dorsally and cranially from this location. It measures roughly 3 cm in   long axis dimension and 1.5 cm in thickness. There is no spinal canal   involvement. There is diffuse surrounding poorly defined muscular   enhancement over an area measuring roughly 5 cm in craniocaudad length.   There is no significant left-sided involvement. Involvement  does not   extend past the trapeziusor into the posterior triangle.    There is underlying lower cervical kyphosis and spondylosis without   significant spinal cord impingement.. Marrow infiltration sparing of the   vertebral column. The right vertebral artery flow-void is preserved   through and above the affected area.    IMPRESSION:  Findings most consistent with osteomyelitis of the right C3-4 facets with   associated suboccipital abscess and associated edema and enhancement   consistent with cellulitis. CT of the cervical spine may be warranted to   evaluate  the degree of osseous destruction.                PIPPA KU M.D., ATTENDING RADIOLOGIST  This document has been electronically signed. Sep  1 2017  3:17PM             ASSESSMENT AND PLAN:      cervical osteomyelitis with cellulitis,  lumbar radiculopathy.        antibiotic as ID.  analgesic.  orthopedic follow up noted ,advised IR drainage.  Physical therapy evaluation.  Would continue to follow.

## 2017-09-03 NOTE — PROGRESS NOTE ADULT - SUBJECTIVE AND OBJECTIVE BOX
Patient is a 72y old  Female who presents with a chief complaint of back pain and hottness (28 Aug 2017 17:09)      INTERVAL HPI/OVERNIGHT EVENTS: Patient seen and examined. NAD. No complaints.      Vital Signs Last 24 Hrs  T(C): 36.6 (03 Sep 2017 05:20), Max: 37.1 (02 Sep 2017 20:25)  T(F): 97.9 (03 Sep 2017 05:20), Max: 98.7 (02 Sep 2017 20:25)  HR: 90 (03 Sep 2017 05:20) (78 - 90)  BP: 161/69 (03 Sep 2017 05:20) (111/68 - 161/69)  BP(mean): --  RR: 17 (03 Sep 2017 05:20) (17 - 18)  SpO2: 97% (03 Sep 2017 05:20) (96% - 99%)I&O's Summary    02 Sep 2017 07:01  -  03 Sep 2017 07:00  --------------------------------------------------------  IN: 650 mL / OUT: 0 mL / NET: 650 mL        LABS:              CAPILLARY BLOOD GLUCOSE  229 (03 Sep 2017 12:04)  178 (03 Sep 2017 07:43)  210 (02 Sep 2017 21:51)  249 (02 Sep 2017 16:26)              simvastatin 20 milliGRAM(s) Oral at bedtime  lisinopril 20 milliGRAM(s) Oral daily  enoxaparin Injectable 40 milliGRAM(s) SubCutaneous daily  insulin lispro (HumaLOG) corrective regimen sliding scale   SubCutaneous three times a day before meals  dextrose 5%. 1000 milliLiter(s) IV Continuous <Continuous>  dextrose Gel 1 Dose(s) Oral once PRN  dextrose 50% Injectable 12.5 Gram(s) IV Push once  dextrose 50% Injectable 25 Gram(s) IV Push once  dextrose 50% Injectable 25 Gram(s) IV Push once  glucagon  Injectable 1 milliGRAM(s) IntraMuscular once PRN  ondansetron Injectable 4 milliGRAM(s) IV Push every 6 hours PRN  acetaminophen   Tablet 650 milliGRAM(s) Oral every 6 hours PRN  ceFAZolin   IVPB   IV Intermittent   ceFAZolin   IVPB 2000 milliGRAM(s) IV Intermittent every 8 hours  traMADol 25 milliGRAM(s) Oral four times a day PRN  oxyCODONE    IR 5 milliGRAM(s) Oral four times a day PRN  morphine  - Injectable 2 milliGRAM(s) IV Push every 6 hours PRN  gabapentin 100 milliGRAM(s) Oral three times a day  lidocaine   Patch 1 Patch Transdermal daily      REVIEW OF SYSTEMS:  CONSTITUTIONAL: No fever, weight loss, or fatigue  NECK: No pain or stiffness  RESPIRATORY: No cough, wheezing, chills or hemoptysis; No shortness of breath  CARDIOVASCULAR: No chest pain, palpitations, dizziness, or leg swelling  GASTROINTESTINAL: No abdominal or epigastric pain. No nausea, vomiting, or hematemesis; No diarrhea or constipation. No melena or hematochezia.  GENITOURINARY: No dysuria, frequency, hematuria, or incontinence  NEUROLOGICAL: No headaches, loss of strength, numbness, or tremors  SKIN: No itching, burning  MUSCULOSKELETAL: No joint pain or swelling; No muscle, back, or extremity pain  PSYCHIATRIC: No depression, mood swings, HEME/LYMPH: No easy bruising, or bleeding gums  ALLERY AND IMMUNOLOGIC: No hives       Consultant(s) Notes Reviewed:  [ x] YES  [ ] NO    PHYSICAL EXAM:  GENERAL: NAD, well-groomed, well-developed  HEAD:  Atraumatic, Normocephalic  EYES: EOMI, PERRLA, conjunctiva and sclera clear  ENMT: No tonsillar erythema, exudates, or enlargement; Moist mucous membranes  NECK: Supple, No JVD  NERVOUS SYSTEM:  Awake & alert  CHEST/LUNG: Clear to auscultation bilaterally; No rales, rhonchi, wheezing,  HEART: Regular rate and rhythm  ABDOMEN: Soft, Nontender, Nondistended; Bowel sounds present  EXTREMITIES:  No clubbing, cyanosis, or edema  LYMPH: No lymphadenopathy noted  SKIN: No rashes      Advanced care planning discussed with patient/family [x ] YES   [ ] NO

## 2017-09-04 LAB
ANION GAP SERPL CALC-SCNC: 6 MMOL/L — SIGNIFICANT CHANGE UP (ref 5–17)
BUN SERPL-MCNC: 18 MG/DL — SIGNIFICANT CHANGE UP (ref 7–23)
CALCIUM SERPL-MCNC: 8.9 MG/DL — SIGNIFICANT CHANGE UP (ref 8.5–10.1)
CHLORIDE SERPL-SCNC: 99 MMOL/L — SIGNIFICANT CHANGE UP (ref 96–108)
CO2 SERPL-SCNC: 32 MMOL/L — HIGH (ref 22–31)
CREAT SERPL-MCNC: 0.81 MG/DL — SIGNIFICANT CHANGE UP (ref 0.5–1.3)
CULTURE RESULTS: SIGNIFICANT CHANGE UP
GLUCOSE SERPL-MCNC: 182 MG/DL — HIGH (ref 70–99)
HCT VFR BLD CALC: 33.2 % — LOW (ref 34.5–45)
HGB BLD-MCNC: 10.7 G/DL — LOW (ref 11.5–15.5)
MAGNESIUM SERPL-MCNC: 2.6 MG/DL — SIGNIFICANT CHANGE UP (ref 1.6–2.6)
MCHC RBC-ENTMCNC: 29.1 PG — SIGNIFICANT CHANGE UP (ref 27–34)
MCHC RBC-ENTMCNC: 32.1 GM/DL — SIGNIFICANT CHANGE UP (ref 32–36)
MCV RBC AUTO: 90.6 FL — SIGNIFICANT CHANGE UP (ref 80–100)
PLATELET # BLD AUTO: 438 K/UL — HIGH (ref 150–400)
POTASSIUM SERPL-MCNC: 4.4 MMOL/L — SIGNIFICANT CHANGE UP (ref 3.5–5.3)
POTASSIUM SERPL-SCNC: 4.4 MMOL/L — SIGNIFICANT CHANGE UP (ref 3.5–5.3)
RBC # BLD: 3.67 M/UL — LOW (ref 3.8–5.2)
RBC # FLD: 13.2 % — SIGNIFICANT CHANGE UP (ref 10.3–14.5)
SODIUM SERPL-SCNC: 137 MMOL/L — SIGNIFICANT CHANGE UP (ref 135–145)
SPECIMEN SOURCE: SIGNIFICANT CHANGE UP
WBC # BLD: 8.5 K/UL — SIGNIFICANT CHANGE UP (ref 3.8–10.5)
WBC # FLD AUTO: 8.5 K/UL — SIGNIFICANT CHANGE UP (ref 3.8–10.5)

## 2017-09-04 RX ORDER — ZALEPLON 10 MG
5 CAPSULE ORAL AT BEDTIME
Qty: 0 | Refills: 0 | Status: DISCONTINUED | OUTPATIENT
Start: 2017-09-04 | End: 2017-09-04

## 2017-09-04 RX ORDER — LANOLIN ALCOHOL/MO/W.PET/CERES
3 CREAM (GRAM) TOPICAL ONCE
Qty: 0 | Refills: 0 | Status: COMPLETED | OUTPATIENT
Start: 2017-09-04 | End: 2017-09-04

## 2017-09-04 RX ORDER — INSULIN LISPRO 100/ML
VIAL (ML) SUBCUTANEOUS EVERY 6 HOURS
Qty: 0 | Refills: 0 | Status: DISCONTINUED | OUTPATIENT
Start: 2017-09-04 | End: 2017-09-05

## 2017-09-04 RX ADMIN — LISINOPRIL 20 MILLIGRAM(S): 2.5 TABLET ORAL at 05:26

## 2017-09-04 RX ADMIN — SIMVASTATIN 20 MILLIGRAM(S): 20 TABLET, FILM COATED ORAL at 21:35

## 2017-09-04 RX ADMIN — Medication 100 MILLIGRAM(S): at 16:12

## 2017-09-04 RX ADMIN — LIDOCAINE 1 PATCH: 4 CREAM TOPICAL at 21:35

## 2017-09-04 RX ADMIN — LIDOCAINE 1 PATCH: 4 CREAM TOPICAL at 00:00

## 2017-09-04 RX ADMIN — Medication 3 MILLIGRAM(S): at 00:19

## 2017-09-04 RX ADMIN — Medication 8: at 12:23

## 2017-09-04 RX ADMIN — Medication 2: at 08:36

## 2017-09-04 RX ADMIN — GABAPENTIN 100 MILLIGRAM(S): 400 CAPSULE ORAL at 05:26

## 2017-09-04 RX ADMIN — LIDOCAINE 1 PATCH: 4 CREAM TOPICAL at 12:22

## 2017-09-04 RX ADMIN — GABAPENTIN 100 MILLIGRAM(S): 400 CAPSULE ORAL at 16:13

## 2017-09-04 RX ADMIN — Medication 2: at 18:16

## 2017-09-04 RX ADMIN — GABAPENTIN 100 MILLIGRAM(S): 400 CAPSULE ORAL at 21:35

## 2017-09-04 RX ADMIN — Medication 5 MILLIGRAM(S): at 21:50

## 2017-09-04 RX ADMIN — Medication 100 MILLIGRAM(S): at 05:26

## 2017-09-04 RX ADMIN — Medication 100 MILLIGRAM(S): at 21:35

## 2017-09-04 RX ADMIN — ENOXAPARIN SODIUM 40 MILLIGRAM(S): 100 INJECTION SUBCUTANEOUS at 12:23

## 2017-09-04 NOTE — PROGRESS NOTE ADULT - SUBJECTIVE AND OBJECTIVE BOX
Patient is a 72y old  Female who presents with a chief complaint of back pain and hottness (28 Aug 2017 17:09)      INTERVAL HPI/OVERNIGHT EVENTS: Patient seen and examined. NAD. No complaints.      Vital Signs Last 24 Hrs  T(C): 36.8 (04 Sep 2017 05:00), Max: 37.5 (03 Sep 2017 13:03)  T(F): 98.3 (04 Sep 2017 05:00), Max: 99.5 (03 Sep 2017 13:03)  HR: 88 (04 Sep 2017 05:00) (80 - 91)  BP: 116/71 (04 Sep 2017 05:00) (116/71 - 149/97)  BP(mean): --  RR: 16 (04 Sep 2017 05:00) (16 - 18)  SpO2: 93% (04 Sep 2017 05:00) (93% - 96%)I&O's Summary    03 Sep 2017 07:01  -  04 Sep 2017 07:00  --------------------------------------------------------  IN: 700 mL / OUT: 0 mL / NET: 700 mL        LABS:                        10.7   8.5   )-----------( 438      ( 04 Sep 2017 06:19 )             33.2     09-04    137  |  99  |  18  ----------------------------<  182<H>  4.4   |  32<H>  |  0.81    Ca    8.9      04 Sep 2017 06:19  Mg     2.6     09-04          CAPILLARY BLOOD GLUCOSE  193 (04 Sep 2017 07:46)  250 (03 Sep 2017 21:22)  174 (03 Sep 2017 17:00)  229 (03 Sep 2017 12:04)              simvastatin 20 milliGRAM(s) Oral at bedtime  lisinopril 20 milliGRAM(s) Oral daily  enoxaparin Injectable 40 milliGRAM(s) SubCutaneous daily  insulin lispro (HumaLOG) corrective regimen sliding scale   SubCutaneous three times a day before meals  dextrose 5%. 1000 milliLiter(s) IV Continuous <Continuous>  dextrose Gel 1 Dose(s) Oral once PRN  dextrose 50% Injectable 12.5 Gram(s) IV Push once  dextrose 50% Injectable 25 Gram(s) IV Push once  dextrose 50% Injectable 25 Gram(s) IV Push once  glucagon  Injectable 1 milliGRAM(s) IntraMuscular once PRN  ondansetron Injectable 4 milliGRAM(s) IV Push every 6 hours PRN  acetaminophen   Tablet 650 milliGRAM(s) Oral every 6 hours PRN  ceFAZolin   IVPB   IV Intermittent   ceFAZolin   IVPB 2000 milliGRAM(s) IV Intermittent every 8 hours  traMADol 25 milliGRAM(s) Oral four times a day PRN  oxyCODONE    IR 5 milliGRAM(s) Oral four times a day PRN  morphine  - Injectable 2 milliGRAM(s) IV Push every 6 hours PRN  gabapentin 100 milliGRAM(s) Oral three times a day  lidocaine   Patch 1 Patch Transdermal daily      REVIEW OF SYSTEMS:  CONSTITUTIONAL: No fever, weight loss, or fatigue  NECK: No pain or stiffness  RESPIRATORY: No cough, wheezing, chills or hemoptysis; No shortness of breath  CARDIOVASCULAR: No chest pain, palpitations, dizziness, or leg swelling  GASTROINTESTINAL: No abdominal or epigastric pain. No nausea, vomiting, or hematemesis; No diarrhea or constipation. No melena or hematochezia.  GENITOURINARY: No dysuria, frequency, hematuria, or incontinence  NEUROLOGICAL: No headaches, loss of strength, numbness, or tremors  SKIN: No itching, burning  MUSCULOSKELETAL: No joint pain or swelling; No muscle, back, or extremity pain  PSYCHIATRIC: No depression, mood swings, HEME/LYMPH: No easy bruising, or bleeding gums  ALLERY AND IMMUNOLOGIC: No hives       Consultant(s) Notes Reviewed:  [x ] YES  [ ] NO    PHYSICAL EXAM:  GENERAL: NAD, well-groomed, well-developed  HEAD:  Atraumatic, Normocephalic  EYES: EOMI, PERRLA, conjunctiva and sclera clear  ENMT: No tonsillar erythema, exudates, or enlargement; Moist mucous membranes  NECK: Supple, No JVD  NERVOUS SYSTEM:  Awake & alert  CHEST/LUNG: Clear to auscultation bilaterally; No rales, rhonchi, wheezing,  HEART: Regular rate and rhythm  ABDOMEN: Soft, Nontender, Nondistended; Bowel sounds present  EXTREMITIES:  No clubbing, cyanosis, or edema  LYMPH: No lymphadenopathy noted  SKIN: No rashes      Advanced care planning discussed with patient/family [x ] YES   [ ] NO

## 2017-09-04 NOTE — PROGRESS NOTE ADULT - SUBJECTIVE AND OBJECTIVE BOX
Neurology Follow up note    SAMIR [id number]    HPI:  pt presents with worsening back pain that started in lower back and now going up to the shoulders, also complains of back feeling very hot. no recent sun exposure, back has always been covered with shirt, no fever or chills but has been taking tylenol for pain. seen by her endocrinologist today for dm management and advised to come to er as her bp was elevated and she was tachycardic. (28 Aug 2017 17:09)      Interval History - neck pain better today.    Patient is seen, chart was reviewed and case was discussed with the treatment team.  Pt is not in any distress.   Lying on bed comfortably.   No events reported overnight.   No clinical seizure was reported.  Sitting on chair bed comfortably.    is at bedside.    Vital Signs Last 24 Hrs  T(C): 36.8 (04 Sep 2017 13:11), Max: 37.1 (03 Sep 2017 19:41)  T(F): 98.3 (04 Sep 2017 13:11), Max: 98.7 (03 Sep 2017 19:41)  HR: 76 (04 Sep 2017 13:11) (76 - 88)  BP: 137/67 (04 Sep 2017 13:11) (116/71 - 149/97)  BP(mean): --  RR: 16 (04 Sep 2017 13:11) (16 - 17)  SpO2: 98% (04 Sep 2017 13:11) (93% - 98%)        REVIEW OF SYSTEMS:    Constitutional: No fever, weight loss or fatigue  Eyes: No eye pain, visual disturbances, or discharge  ENT:  No difficulty hearing, tinnitus, vertigo; No sinus or throat pain  Neck: No pain or stiffness  Respiratory: No cough, wheezing, chills or hemoptysis  Cardiovascular: No chest pain, palpitations, shortness of breath, dizziness or leg swelling  Gastrointestinal: No abdominal or epigastric pain. No nausea, vomiting or hematemesis; No diarrhea or constipation. No melena or hematochezia.  Genitourinary: No dysuria, frequency, hematuria or incontinence  Neurological: No headaches, memory loss, loss of strength, numbness or tremors  Psychiatric: No depression, anxiety, mood swings or difficulty sleeping  Musculoskeletal: No joint pain or swelling; No muscle, back or extremity pain  Skin: No itching, burning, rashes or lesions   Lymph Nodes: No enlarged glands  Endocrine: No heat or cold intolerance; No hair loss, No h/o diabetes or thyroid dysfunction  Allergy and Immunologic: No hives or eczema    On Neurological Examination:    Mental Status - Pt is alert, awake, oriented X3. Higher functions are intact. Follows commands well and able to answer questions appropriately .Mood and affect  normal    Speech -  Normal.     Cranial Nerves - Pupils 3 mm equal and reactive to light, extraocular eye movements intact. Pt has no visual field deficit.  Pt has no  facial asymmetry. Facial sensation is intact. Tongue - is in midline..      Motor Exam - 5/5 of UE.  LE 5-/5    Sensory Exam - Pin prick, temperature, joint position and vibration are intact on either side. Pt withdraws all extremities equally on stimulation. No asymmetry seen. No complaints of tingling, numbness.    Gait - Able to stand and walk unassisted.          coordination:    Finger to nose: normal.      Deep tendon Reflexes - 2 plus all over.        Romberg - Negative.    Neck Supple -  Yes.     MEDICATIONS    simvastatin 20 milliGRAM(s) Oral at bedtime  lisinopril 20 milliGRAM(s) Oral daily  enoxaparin Injectable 40 milliGRAM(s) SubCutaneous daily  insulin lispro (HumaLOG) corrective regimen sliding scale   SubCutaneous three times a day before meals  dextrose 5%. 1000 milliLiter(s) IV Continuous <Continuous>  dextrose Gel 1 Dose(s) Oral once PRN  dextrose 50% Injectable 12.5 Gram(s) IV Push once  dextrose 50% Injectable 25 Gram(s) IV Push once  dextrose 50% Injectable 25 Gram(s) IV Push once  glucagon  Injectable 1 milliGRAM(s) IntraMuscular once PRN  ondansetron Injectable 4 milliGRAM(s) IV Push every 6 hours PRN  acetaminophen   Tablet 650 milliGRAM(s) Oral every 6 hours PRN  ceFAZolin   IVPB   IV Intermittent   ceFAZolin   IVPB 2000 milliGRAM(s) IV Intermittent every 8 hours  traMADol 25 milliGRAM(s) Oral four times a day PRN  oxyCODONE    IR 5 milliGRAM(s) Oral four times a day PRN  morphine  - Injectable 2 milliGRAM(s) IV Push every 6 hours PRN  gabapentin 100 milliGRAM(s) Oral three times a day  lidocaine   Patch 1 Patch Transdermal daily      Allergies    No Known Allergies    Intolerances        LABS:  CBC Full  -  ( 04 Sep 2017 06:19 )  WBC Count : 8.5 K/uL  Hemoglobin : 10.7 g/dL  Hematocrit : 33.2 %  Platelet Count - Automated : 438 K/uL  Mean Cell Volume : 90.6 fl  Mean Cell Hemoglobin : 29.1 pg  Mean Cell Hemoglobin Concentration : 32.1 gm/dL  Auto Neutrophil # : x  Auto Lymphocyte # : x  Auto Monocyte # : x  Auto Eosinophil # : x  Auto Basophil # : x  Auto Neutrophil % : x  Auto Lymphocyte % : x  Auto Monocyte % : x  Auto Eosinophil % : x  Auto Basophil % : x      09-04    137  |  99  |  18  ----------------------------<  182<H>  4.4   |  32<H>  |  0.81    Ca    8.9      04 Sep 2017 06:19  Mg     2.6     09-04      Hemoglobin A1C:     Vitamin B12     RADIOLOGY.    < from: MRI Orbit, Face, and/or Neck w/wo Cont, Bilat (09.01.17 @ 14:27) >    EXAM:  MRI ORBIT FACE & NECK [facility]                            PROCEDURE DATE:  09/01/2017          INTERPRETATION:  MRI of neck with and without contrast  History MRSA sepsis, cellulitis  Contrast Gadavist 10 cc    There is abnormal marrow signalin the facets at C3-4 on the right. There   is surrounding soft tissue edema. There is a fluid collection tracking   dorsally and cranially from this location. It measures roughly 3 cm in   long axis dimension and 1.5 cm in thickness. There is no spinal canal   involvement. There is diffuse surrounding poorly defined muscular   enhancement over an area measuring roughly 5 cm in craniocaudad length.   There is no significant left-sided involvement. Involvement  does not   extend past the trapeziusor into the posterior triangle.    There is underlying lower cervical kyphosis and spondylosis without   significant spinal cord impingement.. Marrow infiltration sparing of the   vertebral column. The right vertebral artery flow-void is preserved   through and above the affected area.    IMPRESSION:  Findings most consistent with osteomyelitis of the right C3-4 facets with   associated suboccipital abscess and associated edema and enhancement   consistent with cellulitis. CT of the cervical spine may be warranted to   evaluate  the degree of osseous destruction.                PIPPA KU M.D., ATTENDING RADIOLOGIST  This document has been electronically signed. Sep  1 2017  3:17PM             ASSESSMENT AND PLAN:        CERVICAL OSTEOMYELITIS.  STAPH BACTEREMIA.    FOR IR DRAINAGE TOMORROW.  ANTIBIOTIC AS PER ID.  ANALGESIC.  Physical therapy evaluation.  Would continue to follow.

## 2017-09-04 NOTE — PROGRESS NOTE ADULT - SUBJECTIVE AND OBJECTIVE BOX
[facility], Division of Infectious Diseases  THOMAS Ledezma A. Lee  [phone]    Name: SAMIR CAREY  Age: 72y  Gender: Female  MRN: [id number]    Interval History--  Notes reviewed  neck a little stiff.  feels well, eating lunch    Past Medical History--  Abscess of paraspinal muscles  Scoliosis  Basal cell cancer  OA (osteoarthritis)  Hypertension  Elevated cholesterol  Diabetes mellitus  H/O arthroscopic knee surgery  H/O: hysterectomy      For details regarding the patient's social history, family history, and other miscellaneous elements, please refer the initial infectious diseases consultation and/or the admitting history and physical examination for this admission.    Allergies    No Known Allergies    Intolerances        Medications--  Antibiotics:  ceFAZolin   IVPB   IV Intermittent   ceFAZolin   IVPB 2000 milliGRAM(s) IV Intermittent every 8 hours    Immunologic:    Other:  simvastatin  lisinopril  enoxaparin Injectable  insulin lispro (HumaLOG) corrective regimen sliding scale  dextrose 5%.  dextrose Gel PRN  dextrose 50% Injectable  dextrose 50% Injectable  dextrose 50% Injectable  glucagon  Injectable PRN  ondansetron Injectable PRN  acetaminophen   Tablet PRN  traMADol PRN  oxyCODONE    IR PRN  morphine  - Injectable PRN  gabapentin  lidocaine   Patch      Review of Systems--  A 10-point review of systems was obtained.     Pertinent positives and negatives--  Constitutional: No fevers. No Chills. No Rigors.   Cardiovascular: No chest pain. No palpitations.  Respiratory: No shortness of breath. No cough.  Gastrointestinal: No nausea or vomiting. No diarrhea or constipation.     Review of systems otherwise negative except as previously noted.    Physical Examination--  Vital Signs: T(F): 98.3 (09-04-17 @ 05:00), Max: 99.5 (09-03-17 @ 13:03)  HR: 88 (09-04-17 @ 05:00)  BP: 116/71 (09-04-17 @ 05:00)  RR: 16 (09-04-17 @ 05:00)  SpO2: 93% (09-04-17 @ 05:00)  Wt(kg): --  General: Nontoxic-appearing Female in no acute distress.  HEENT: AT/NC. I. Anicteric. Conjunctiva pink and moist. Oropharynx clear. Dentition fair.  Neck: Not rigid. No sense of mass.  Nodes: None palpable.  Lungs: Clear bilaterally without rales, wheezing or rhonchi  Heart: Regular rate and rhythm. No Murmur. No rub.  Abdomen: Bowel sounds present and normoactive. Soft. Nondistended. Nontender. No sense of mass. No organomegaly.  Back: No spinal tenderness. No costovertebral angle tenderness.   Extremities: No cyanosis or clubbing. trace  edema.   Skin: Warm. Dry. Good turgor. No rash. No vasculitic stigmata        Laboratory Studies--  CBC                        10.7   8.5   )-----------( 438      ( 04 Sep 2017 06:19 )             33.2       Chemistries  09-04    137  |  99  |  18  ----------------------------<  182<H>  4.4   |  32<H>  |  0.81    Ca    8.9      04 Sep 2017 06:19  Mg     2.6     09-04        Culture Data    Culture - Blood in AM (09.01.17 @ 08:54)    Specimen Source: .Blood Blood-Venous    Culture Results:   No growth to date.    Culture - Blood in AM (08.31.17 @ 08:54)    Specimen Source: .Blood Blood-Venous    Culture Results:   No growth to date.        Culture - Blood (08.30.17 @ 15:38)    Gram Stain:   Growth in aerobic bottle: Gram Positive Cocci in Clusters    Specimen Source: .Blood Blood    Culture Results:   Growth in aerobic bottle: Staphylococcus aureus  See previous culture [id number]

## 2017-09-05 LAB
APTT BLD: 30.3 SEC — SIGNIFICANT CHANGE UP (ref 27.5–37.4)
CULTURE RESULTS: SIGNIFICANT CHANGE UP
CULTURE RESULTS: SIGNIFICANT CHANGE UP
GRAM STN FLD: SIGNIFICANT CHANGE UP
INR BLD: 1.15 RATIO — SIGNIFICANT CHANGE UP (ref 0.88–1.16)
PROTHROM AB SERPL-ACNC: 12.6 SEC — SIGNIFICANT CHANGE UP (ref 9.8–12.7)
SPECIMEN SOURCE: SIGNIFICANT CHANGE UP

## 2017-09-05 PROCEDURE — 77012 CT SCAN FOR NEEDLE BIOPSY: CPT | Mod: 26

## 2017-09-05 PROCEDURE — 10160 PNXR ASPIR ABSC HMTMA BULLA: CPT

## 2017-09-05 RX ORDER — DEXTROSE 50 % IN WATER 50 %
25 SYRINGE (ML) INTRAVENOUS ONCE
Qty: 0 | Refills: 0 | Status: DISCONTINUED | OUTPATIENT
Start: 2017-09-05 | End: 2017-09-07

## 2017-09-05 RX ORDER — SODIUM CHLORIDE 9 MG/ML
1000 INJECTION, SOLUTION INTRAVENOUS
Qty: 0 | Refills: 0 | Status: DISCONTINUED | OUTPATIENT
Start: 2017-09-05 | End: 2017-09-07

## 2017-09-05 RX ORDER — DEXTROSE 50 % IN WATER 50 %
12.5 SYRINGE (ML) INTRAVENOUS ONCE
Qty: 0 | Refills: 0 | Status: DISCONTINUED | OUTPATIENT
Start: 2017-09-05 | End: 2017-09-07

## 2017-09-05 RX ORDER — INSULIN LISPRO 100/ML
VIAL (ML) SUBCUTANEOUS AT BEDTIME
Qty: 0 | Refills: 0 | Status: DISCONTINUED | OUTPATIENT
Start: 2017-09-05 | End: 2017-09-07

## 2017-09-05 RX ORDER — CEFAZOLIN SODIUM 1 G
2 VIAL (EA) INJECTION
Qty: 42 | Refills: 0 | OUTPATIENT
Start: 2017-09-05 | End: 2017-10-17

## 2017-09-05 RX ORDER — INSULIN LISPRO 100/ML
VIAL (ML) SUBCUTANEOUS
Qty: 0 | Refills: 0 | Status: DISCONTINUED | OUTPATIENT
Start: 2017-09-05 | End: 2017-09-07

## 2017-09-05 RX ORDER — ZALEPLON 10 MG
5 CAPSULE ORAL AT BEDTIME
Qty: 0 | Refills: 0 | Status: DISCONTINUED | OUTPATIENT
Start: 2017-09-05 | End: 2017-09-05

## 2017-09-05 RX ORDER — DEXTROSE 50 % IN WATER 50 %
1 SYRINGE (ML) INTRAVENOUS ONCE
Qty: 0 | Refills: 0 | Status: DISCONTINUED | OUTPATIENT
Start: 2017-09-05 | End: 2017-09-07

## 2017-09-05 RX ORDER — GLUCAGON INJECTION, SOLUTION 0.5 MG/.1ML
1 INJECTION, SOLUTION SUBCUTANEOUS ONCE
Qty: 0 | Refills: 0 | Status: DISCONTINUED | OUTPATIENT
Start: 2017-09-05 | End: 2017-09-07

## 2017-09-05 RX ADMIN — LIDOCAINE 1 PATCH: 4 CREAM TOPICAL at 11:21

## 2017-09-05 RX ADMIN — GABAPENTIN 100 MILLIGRAM(S): 400 CAPSULE ORAL at 21:32

## 2017-09-05 RX ADMIN — Medication 100 MILLIGRAM(S): at 05:28

## 2017-09-05 RX ADMIN — Medication 2: at 07:14

## 2017-09-05 RX ADMIN — GABAPENTIN 100 MILLIGRAM(S): 400 CAPSULE ORAL at 05:28

## 2017-09-05 RX ADMIN — GABAPENTIN 100 MILLIGRAM(S): 400 CAPSULE ORAL at 13:31

## 2017-09-05 RX ADMIN — Medication 100 MILLIGRAM(S): at 21:31

## 2017-09-05 RX ADMIN — Medication 6: at 12:32

## 2017-09-05 RX ADMIN — Medication 100 MILLIGRAM(S): at 13:31

## 2017-09-05 RX ADMIN — LISINOPRIL 20 MILLIGRAM(S): 2.5 TABLET ORAL at 05:28

## 2017-09-05 RX ADMIN — LIDOCAINE 1 PATCH: 4 CREAM TOPICAL at 21:30

## 2017-09-05 RX ADMIN — Medication 5 MILLIGRAM(S): at 21:31

## 2017-09-05 RX ADMIN — SIMVASTATIN 20 MILLIGRAM(S): 20 TABLET, FILM COATED ORAL at 21:32

## 2017-09-05 RX ADMIN — Medication 4: at 17:30

## 2017-09-05 NOTE — PROGRESS NOTE ADULT - SUBJECTIVE AND OBJECTIVE BOX
infectious diseases progress note:    SAMIR CAREY is a 72y y. o. Female patient    Patient reports: feeling much better, more neck mobility, will learn to give abx so she can go home on IV abx    ROS:    EYES:  Negative  blurry vision or double vision  GASTROINTESTINAL:  Negative for nausea, vomiting, diarrhea  -otherwise negative except for subjective    Allergies    No Known Allergies    Intolerances        ANTIBIOTICS/RELEVANT:  antimicrobials  ceFAZolin   IVPB   IV Intermittent   ceFAZolin   IVPB 2000 milliGRAM(s) IV Intermittent every 8 hours    immunologic:    OTHER:  simvastatin 20 milliGRAM(s) Oral at bedtime  lisinopril 20 milliGRAM(s) Oral daily  dextrose 5%. 1000 milliLiter(s) IV Continuous <Continuous>  dextrose Gel 1 Dose(s) Oral once PRN  dextrose 50% Injectable 12.5 Gram(s) IV Push once  dextrose 50% Injectable 25 Gram(s) IV Push once  dextrose 50% Injectable 25 Gram(s) IV Push once  glucagon  Injectable 1 milliGRAM(s) IntraMuscular once PRN  ondansetron Injectable 4 milliGRAM(s) IV Push every 6 hours PRN  acetaminophen   Tablet 650 milliGRAM(s) Oral every 6 hours PRN  traMADol 25 milliGRAM(s) Oral four times a day PRN  oxyCODONE    IR 5 milliGRAM(s) Oral four times a day PRN  morphine  - Injectable 2 milliGRAM(s) IV Push every 6 hours PRN  gabapentin 100 milliGRAM(s) Oral three times a day  lidocaine   Patch 1 Patch Transdermal daily  insulin lispro (HumaLOG) corrective regimen sliding scale   SubCutaneous every 6 hours      Objective:  Vital Signs Last 24 Hrs  T(C): 36.8 (05 Sep 2017 08:15), Max: 36.8 (04 Sep 2017 21:07)  T(F): 98.3 (05 Sep 2017 08:15), Max: 98.3 (04 Sep 2017 21:07)  HR: 94 (05 Sep 2017 12:49) (72 - 94)  BP: 104/71 (05 Sep 2017 12:49) (104/71 - 113/60)  BP(mean): --  RR: 18 (05 Sep 2017 12:49) (16 - 18)  SpO2: 97% (05 Sep 2017 12:49) (95% - 97%)    T(C): 36.8 (05 Sep 2017 08:15), Max: 37.1 (03 Sep 2017 19:41)  T(C): 36.8 (05 Sep 2017 08:15), Max: 37.5 (03 Sep 2017 13:03)  T(C): 36.8 (05 Sep 2017 08:15), Max: 37.5 (03 Sep 2017 13:03)    PHYSICAL EXAM:  Constitutional:Well-developed, well nourished  Eyes:PERRLA, EOMI  Ear/Nose/Throat: oropharynx normal	  Neck:no JVD, no lymphadenopathy, supple, decreased swelling  Respiratory: no accessory muscle use  Cardiovascular:RRR,   Gastrointestinal:soft, NT  Extremities:no clubbing, no cyanosis, edema absent      LABS:                        10.7   8.5   )-----------( 438      ( 04 Sep 2017 06:19 )             33.2       8.5 09-04 @ 06:19  7.7 08-30 @ 06:57      09-04    137  |  99  |  18  ----------------------------<  182<H>  4.4   |  32<H>  |  0.81    Ca    8.9      04 Sep 2017 06:19  Mg     2.6     09-04      PT/INR - ( 05 Sep 2017 06:38 )   PT: 12.6 sec;   INR: 1.15 ratio         PTT - ( 05 Sep 2017 06:38 )  PTT:30.3 sec        MICROBIOLOGY:          RADIOLOGY & ADDITIONAL STUDIES:

## 2017-09-05 NOTE — BRIEF OPERATIVE NOTE - PRE-OP DX
Abscess of neck  09/05/2017    Active  Serge Maldonado  Intertrochanteric fracture of femur  09/01/2017    Active  Donald Mckee

## 2017-09-05 NOTE — PROGRESS NOTE ADULT - SUBJECTIVE AND OBJECTIVE BOX
Patient is a 72y old  Female who presents with a chief complaint of back pain and hottness (28 Aug 2017 17:09)      INTERVAL HPI/OVERNIGHT EVENTS: Patient seen and examined. NAD. No complaints.      Vital Signs Last 24 Hrs  T(C): 36.8 (05 Sep 2017 08:15), Max: 36.8 (04 Sep 2017 13:11)  T(F): 98.3 (05 Sep 2017 08:15), Max: 98.3 (04 Sep 2017 13:11)  HR: 78 (05 Sep 2017 09:45) (72 - 78)  BP: 109/61 (05 Sep 2017 09:45) (109/61 - 137/67)  BP(mean): --  RR: 17 (05 Sep 2017 09:45) (16 - 18)  SpO2: 97% (05 Sep 2017 09:45) (95% - 98%)I&O's Summary    04 Sep 2017 07:01  -  05 Sep 2017 07:00  --------------------------------------------------------  IN: 600 mL / OUT: 0 mL / NET: 600 mL        LABS:                        10.7   8.5   )-----------( 438      ( 04 Sep 2017 06:19 )             33.2     09-04    137  |  99  |  18  ----------------------------<  182<H>  4.4   |  32<H>  |  0.81    Ca    8.9      04 Sep 2017 06:19  Mg     2.6     09-04      PT/INR - ( 05 Sep 2017 06:38 )   PT: 12.6 sec;   INR: 1.15 ratio         PTT - ( 05 Sep 2017 06:38 )  PTT:30.3 sec    CAPILLARY BLOOD GLUCOSE  291 (05 Sep 2017 11:52)  182 (05 Sep 2017 06:14)  165 (05 Sep 2017 00:56)  174 (04 Sep 2017 21:07)  173 (04 Sep 2017 16:35)              simvastatin 20 milliGRAM(s) Oral at bedtime  lisinopril 20 milliGRAM(s) Oral daily  dextrose 5%. 1000 milliLiter(s) IV Continuous <Continuous>  dextrose Gel 1 Dose(s) Oral once PRN  dextrose 50% Injectable 12.5 Gram(s) IV Push once  dextrose 50% Injectable 25 Gram(s) IV Push once  dextrose 50% Injectable 25 Gram(s) IV Push once  glucagon  Injectable 1 milliGRAM(s) IntraMuscular once PRN  ondansetron Injectable 4 milliGRAM(s) IV Push every 6 hours PRN  acetaminophen   Tablet 650 milliGRAM(s) Oral every 6 hours PRN  ceFAZolin   IVPB   IV Intermittent   ceFAZolin   IVPB 2000 milliGRAM(s) IV Intermittent every 8 hours  traMADol 25 milliGRAM(s) Oral four times a day PRN  oxyCODONE    IR 5 milliGRAM(s) Oral four times a day PRN  morphine  - Injectable 2 milliGRAM(s) IV Push every 6 hours PRN  gabapentin 100 milliGRAM(s) Oral three times a day  lidocaine   Patch 1 Patch Transdermal daily  insulin lispro (HumaLOG) corrective regimen sliding scale   SubCutaneous every 6 hours      REVIEW OF SYSTEMS:  CONSTITUTIONAL: No fever, weight loss, or fatigue  NECK: No pain or stiffness  RESPIRATORY: No cough, wheezing, chills or hemoptysis; No shortness of breath  CARDIOVASCULAR: No chest pain, palpitations, dizziness, or leg swelling  GASTROINTESTINAL: No abdominal or epigastric pain. No nausea, vomiting, or hematemesis; No diarrhea or constipation. No melena or hematochezia.  GENITOURINARY: No dysuria, frequency, hematuria, or incontinence  NEUROLOGICAL: No headaches, loss of strength, numbness, or tremors  SKIN: No itching, burning  MUSCULOSKELETAL: No joint pain or swelling; No muscle, back, or extremity pain  PSYCHIATRIC: No depression, mood swings, HEME/LYMPH: No easy bruising, or bleeding gums  ALLERY AND IMMUNOLOGIC: No hives       Consultant(s) Notes Reviewed:  [ x] YES  [ ] NO    PHYSICAL EXAM:  GENERAL: NAD, well-groomed, well-developed  HEAD:  Atraumatic, Normocephalic  EYES: EOMI, PERRLA, conjunctiva and sclera clear  ENMT: No tonsillar erythema, exudates, or enlargement; Moist mucous membranes  NECK: Supple, No JVD  NERVOUS SYSTEM:  Awake & alert  CHEST/LUNG: Clear to auscultation bilaterally; No rales, rhonchi, wheezing,  HEART: Regular rate and rhythm  ABDOMEN: Soft, Nontender, Nondistended; Bowel sounds present  EXTREMITIES:  No clubbing, cyanosis, or edema  LYMPH: No lymphadenopathy noted  SKIN: No rashes      Advanced care planning discussed with patient/family [x ] YES   [ ] NO

## 2017-09-06 ENCOUNTER — TRANSCRIPTION ENCOUNTER (OUTPATIENT)
Age: 72
End: 2017-09-06

## 2017-09-06 LAB
CULTURE RESULTS: SIGNIFICANT CHANGE UP
CULTURE RESULTS: SIGNIFICANT CHANGE UP
SPECIMEN SOURCE: SIGNIFICANT CHANGE UP
SPECIMEN SOURCE: SIGNIFICANT CHANGE UP

## 2017-09-06 RX ORDER — CEFAZOLIN SODIUM 1 G
2 VIAL (EA) INJECTION
Qty: 42 | Refills: 0
Start: 2017-09-06 | End: 2017-10-18

## 2017-09-06 RX ORDER — ASPIRIN/CALCIUM CARB/MAGNESIUM 324 MG
81 TABLET ORAL DAILY
Qty: 0 | Refills: 0 | Status: DISCONTINUED | OUTPATIENT
Start: 2017-09-06 | End: 2017-09-07

## 2017-09-06 RX ORDER — GABAPENTIN 400 MG/1
1 CAPSULE ORAL
Qty: 90 | Refills: 0
Start: 2017-09-06 | End: 2017-10-06

## 2017-09-06 RX ORDER — LIDOCAINE 4 G/100G
1 CREAM TOPICAL
Qty: 30 | Refills: 0 | OUTPATIENT
Start: 2017-09-06 | End: 2017-10-06

## 2017-09-06 RX ORDER — ZALEPLON 10 MG
5 CAPSULE ORAL AT BEDTIME
Qty: 0 | Refills: 0 | Status: DISCONTINUED | OUTPATIENT
Start: 2017-09-06 | End: 2017-09-07

## 2017-09-06 RX ORDER — LANOLIN ALCOHOL/MO/W.PET/CERES
3 CREAM (GRAM) TOPICAL AT BEDTIME
Qty: 0 | Refills: 0 | Status: DISCONTINUED | OUTPATIENT
Start: 2017-09-06 | End: 2017-09-07

## 2017-09-06 RX ADMIN — GABAPENTIN 100 MILLIGRAM(S): 400 CAPSULE ORAL at 14:06

## 2017-09-06 RX ADMIN — LISINOPRIL 20 MILLIGRAM(S): 2.5 TABLET ORAL at 05:36

## 2017-09-06 RX ADMIN — GABAPENTIN 100 MILLIGRAM(S): 400 CAPSULE ORAL at 05:36

## 2017-09-06 RX ADMIN — Medication 100 MILLIGRAM(S): at 21:36

## 2017-09-06 RX ADMIN — GABAPENTIN 100 MILLIGRAM(S): 400 CAPSULE ORAL at 21:36

## 2017-09-06 RX ADMIN — Medication 100 MILLIGRAM(S): at 14:06

## 2017-09-06 RX ADMIN — Medication 100 MILLIGRAM(S): at 05:35

## 2017-09-06 RX ADMIN — Medication 1: at 08:15

## 2017-09-06 RX ADMIN — SIMVASTATIN 20 MILLIGRAM(S): 20 TABLET, FILM COATED ORAL at 21:36

## 2017-09-06 RX ADMIN — Medication 2: at 12:43

## 2017-09-06 RX ADMIN — Medication 5 MILLIGRAM(S): at 23:49

## 2017-09-06 RX ADMIN — Medication 81 MILLIGRAM(S): at 17:45

## 2017-09-06 RX ADMIN — Medication 3: at 17:45

## 2017-09-06 NOTE — PROGRESS NOTE ADULT - PROVIDER SPECIALTY LIST ADULT
Hospitalist
Infectious Disease
Internal Medicine
Neurology
Orthopedics
Infectious Disease

## 2017-09-06 NOTE — DISCHARGE NOTE ADULT - MEDICATION SUMMARY - MEDICATIONS TO STOP TAKING
I will STOP taking the medications listed below when I get home from the hospital:    Norco 5 mg-325 mg oral tablet  -- 1 tab(s) by mouth every 6 hours as needed for pain

## 2017-09-06 NOTE — PROGRESS NOTE ADULT - ASSESSMENT
71 yo diabetic adm with fever, neck pain and MSSA bacteremia now cleared as of 8/30 and unremarkable
72 female with cellulitis
72F w/ OM cervical spine and occipital muscle abscess, no epidural abscess or neurological deficits    Analgesia  DVT ppx while admitted  Medical management  Abx per ID recommendations  Recommend IR aspiration of muscular abscess, superficial to the spine  No acute surgical intervention
71 yo diabetic adm with fever, neck pain and MSSA bacteremia
72 female with cellulitis
72F with MSSA septicemia/bacteremia/osteomyelitis c3-c4  and abscess
73 yo diabetic adm with fever, neck pain and persistent MSSA bacteremia

## 2017-09-06 NOTE — PROGRESS NOTE ADULT - PROBLEM SELECTOR PLAN 4
accelerated due to pain  resume home meds
accelerated due to pain  resume home meds
hold oral meds  riss  strict glycemic control
accelerated due to pain  resume home meds
hold oral meds  riss  strict glycemic control

## 2017-09-06 NOTE — DISCHARGE NOTE ADULT - HOSPITAL COURSE
admitted for sepsis from cellulitis  found to have MSSA bacteremia  responded well to ABX  ABX adjusted based on Cx and sensitivities  Also has occipital OM and abcess  underwent IR abscess drinage  DC after ID and Ortho clearance

## 2017-09-06 NOTE — PROGRESS NOTE ADULT - PROBLEM SELECTOR PROBLEM 2
Back pain
Cellulitis
Cellulitis
Back pain
Cellulitis
Other osteomyelitis
Other osteomyelitis

## 2017-09-06 NOTE — PROGRESS NOTE ADULT - PROBLEM SELECTOR PLAN 8
resolved with tylenol
source - from cervical abscess  s/p MRI
source - from cervical abscess  s/p MRI
resolved with tylenol
resolved with tylenol
source - from cervical abscess  s/p MRI
resolved with tylenol

## 2017-09-06 NOTE — PROGRESS NOTE ADULT - SUBJECTIVE AND OBJECTIVE BOX
infectious diseases progress note:    SAMIR CAREY is a 72y y. o. Female patient    Patient reports: she is ready to go home today no new issues    ROS:    EYES:  Negative  blurry vision or double vision  GASTROINTESTINAL:  Negative for nausea, vomiting, diarrhea  -otherwise negative except for subjective    Allergies    No Known Allergies    Intolerances        ANTIBIOTICS/RELEVANT:  antimicrobials  ceFAZolin   IVPB   IV Intermittent   ceFAZolin   IVPB 2000 milliGRAM(s) IV Intermittent every 8 hours    immunologic:    OTHER:  simvastatin 20 milliGRAM(s) Oral at bedtime  lisinopril 20 milliGRAM(s) Oral daily  ondansetron Injectable 4 milliGRAM(s) IV Push every 6 hours PRN  acetaminophen   Tablet 650 milliGRAM(s) Oral every 6 hours PRN  gabapentin 100 milliGRAM(s) Oral three times a day  lidocaine   Patch 1 Patch Transdermal daily  insulin lispro (HumaLOG) corrective regimen sliding scale   SubCutaneous three times a day before meals  insulin lispro (HumaLOG) corrective regimen sliding scale   SubCutaneous at bedtime  dextrose 5%. 1000 milliLiter(s) IV Continuous <Continuous>  dextrose Gel 1 Dose(s) Oral once PRN  dextrose 50% Injectable 12.5 Gram(s) IV Push once  dextrose 50% Injectable 25 Gram(s) IV Push once  dextrose 50% Injectable 25 Gram(s) IV Push once  glucagon  Injectable 1 milliGRAM(s) IntraMuscular once PRN      Objective:  Vital Signs Last 24 Hrs  T(C): 37.1 (06 Sep 2017 05:26), Max: 37.1 (05 Sep 2017 13:27)  T(F): 98.7 (06 Sep 2017 05:26), Max: 98.7 (05 Sep 2017 13:27)  HR: 67 (06 Sep 2017 05:26) (67 - 94)  BP: 114/72 (06 Sep 2017 05:26) (102/62 - 114/72)  BP(mean): --  RR: 17 (06 Sep 2017 05:26) (17 - 18)  SpO2: 96% (06 Sep 2017 05:26) (96% - 97%)    T(C): 37.1 (06 Sep 2017 05:26), Max: 37.1 (05 Sep 2017 13:27)  T(C): 37.1 (06 Sep 2017 05:26), Max: 37.5 (03 Sep 2017 13:03)  T(C): 37.1 (06 Sep 2017 05:26), Max: 37.5 (03 Sep 2017 13:03)    PHYSICAL EXAM:  Constitutional:Well-developed, well nourished  Eyes:PERRLA, EOMI  Ear/Nose/Throat: oropharynx normal	  Neck:no JVD, no lymphadenopathy, supple  Respiratory: no accessory muscle use  Cardiovascular:RRR,   Gastrointestinal:soft, NT  Extremities:no clubbing, no cyanosis, edema absent      LABS:      8.5 09-04 @ 06:19            PT/INR - ( 05 Sep 2017 06:38 )   PT: 12.6 sec;   INR: 1.15 ratio         PTT - ( 05 Sep 2017 06:38 )  PTT:30.3 sec        MICROBIOLOGY:          RADIOLOGY & ADDITIONAL STUDIES:

## 2017-09-06 NOTE — DISCHARGE NOTE ADULT - CARE PROVIDER_API CALL
Eduin Person (DO), Cardiovascular Disease; Internal Medicine  [address]  [address]  [place] [address]  Phone: [phone]  Fax: [phone]    Dickson Benites; PhD), Infectious Disease; Internal Medicine  [address]  [place] [address]  Phone: [phone]  Fax: [phone]    Leeroy Kwon), Orthopaedic Surgery  [address]  [place] [address]  Phone: [phone]  Fax: [phone]    Joel Felton), Orthopaedic Surgery Surgery  [address] [place] [address]  Phone: [phone]  Fax: [phone]

## 2017-09-06 NOTE — DISCHARGE NOTE ADULT - CARE PROVIDERS DIRECT ADDRESSES
,DirectAddress_Unknown,[email or web address],DirectAddress_Unknown,DirectAddress_Unknown

## 2017-09-06 NOTE — PROGRESS NOTE ADULT - PROBLEM SELECTOR PROBLEM 6
Elevated cholesterol
Elevated cholesterol
Tachycardia
Elevated cholesterol
Tachycardia

## 2017-09-06 NOTE — PROGRESS NOTE ADULT - PROBLEM SELECTOR PLAN 3
accelerated due to pain  resume home meds
pain control, neuro eval with dr. espino appreciated  likely from cellulitis  may need lidoderm patch - watch over next 24 hrs
pain control, neuro eval with dr. espino appreciated  likely from cellulitis  may need lidoderm patch - watch over next 24 hrs
accelerated due to pain  resume home meds
pain control, neuro eval with dr. espino appreciated  likely from cellulitis  may need lidoderm patch - watch over next 24 hrs

## 2017-09-06 NOTE — PROGRESS NOTE ADULT - PROBLEM SELECTOR PLAN 10
IR drainage per ortho / spine
needs 6 weeks abx  PICC in place
IR drainage per ortho / spine

## 2017-09-06 NOTE — PROGRESS NOTE ADULT - PROBLEM SELECTOR PROBLEM 3
Back pain
Back pain
Hypertension
Back pain
Hypertension

## 2017-09-06 NOTE — PROGRESS NOTE ADULT - PROBLEM SELECTOR PLAN 7
from infection  fluid rescusitation  improved
from infection  fluid rescusitation  improved
source - from cervical abscess  s/p MRI
? source  s/p MRI
? source  s/p MRI
from infection  fluid rescusitation  improved
source - from cervical abscess  s/p MRI
? source  needs MRI - refusing

## 2017-09-06 NOTE — DISCHARGE NOTE ADULT - PATIENT PORTAL LINK FT
“You can access the [facility] Patient Portal, offered by [facility], by registering with the following website: [email or web address]”

## 2017-09-06 NOTE — PROGRESS NOTE ADULT - PROBLEM SELECTOR PROBLEM 1
Abscess of paraspinal muscles
Abscess of paraspinal muscles
Cellulitis
Staphylococcus aureus bacteremia
Abscess of paraspinal muscles
Cellulitis
Staphylococcus aureus bacteremia

## 2017-09-06 NOTE — DISCHARGE NOTE ADULT - MEDICATION SUMMARY - MEDICATIONS TO TAKE
I will START or STAY ON the medications listed below when I get home from the hospital:    PT  -- 3 to 5 times a week  -- Indication: For Back pain    aspirin 81 mg oral tablet  -- 1 tab(s) by mouth once a day  -- Indication: For prevent heart disease    gabapentin 100 mg oral capsule  -- 1 cap(s) by mouth 3 times a day  -- Indication: For pain    Onglyza 5 mg oral tablet  -- 1 tab(s) by mouth once a day  -- Indication: For Diabetes mellitus    glimepiride 2 mg oral tablet  -- 1 tab(s) by mouth once a day with breakfast  -- Indication: For Diabetes mellitus    simvastatin 20 mg oral tablet  -- 1 tab(s) by mouth once a day (at bedtime)  -- Indication: For Elevated cholesterol    fosinopril-hydrochlorothiazide 20 mg-12.5 mg oral tablet  -- 1 tab(s) by mouth once a day (at bedtime)  -- Indication: For Hypertension    zolpidem 10 mg oral tablet  -- 1 tab(s) by mouth once a day (at bedtime), As Needed - for insomnia  -- Indication: For Insomnia    ceFAZolin 2 g/20 mL injectable solution  -- 2 gram(s) injectable 3 times a day until 10/17 /17  -- Indication: For Other osteomyelitis    carisoprodol 350 mg oral tablet  -- milligram(s) by mouth 2 times a day, As Needed  -- Indication: For Muscle spasm    Fish Oil 1000 mg oral capsule  --  by mouth once a day  -- Indication: For Suplement    Multi-Day Plus Minerals oral tablet  -- 1 tab(s) by mouth once a day  -- Indication: For Suplement    Calcium 500+D  -- 1 tab(s) by mouth once a day  -- Indication: For Suplement

## 2017-09-06 NOTE — PROGRESS NOTE ADULT - PROBLEM SELECTOR PROBLEM 4
Diabetes mellitus
Hypertension
Hypertension
Diabetes mellitus
Hypertension

## 2017-09-06 NOTE — PROGRESS NOTE ADULT - SUBJECTIVE AND OBJECTIVE BOX
Patient is a 72y old  Female who presents with a chief complaint of back pain and hottness (28 Aug 2017 17:09)      INTERVAL /OVERNIGHT EVENTS: neck pain better    MEDICATIONS  (STANDING):  simvastatin 20 milliGRAM(s) Oral at bedtime  lisinopril 20 milliGRAM(s) Oral daily  ceFAZolin   IVPB   IV Intermittent   ceFAZolin   IVPB 2000 milliGRAM(s) IV Intermittent every 8 hours  gabapentin 100 milliGRAM(s) Oral three times a day  lidocaine   Patch 1 Patch Transdermal daily  insulin lispro (HumaLOG) corrective regimen sliding scale   SubCutaneous three times a day before meals  insulin lispro (HumaLOG) corrective regimen sliding scale   SubCutaneous at bedtime  dextrose 5%. 1000 milliLiter(s) (50 mL/Hr) IV Continuous <Continuous>  dextrose 50% Injectable 12.5 Gram(s) IV Push once  dextrose 50% Injectable 25 Gram(s) IV Push once  dextrose 50% Injectable 25 Gram(s) IV Push once  aspirin enteric coated 81 milliGRAM(s) Oral daily    MEDICATIONS  (PRN):  ondansetron Injectable 4 milliGRAM(s) IV Push every 6 hours PRN Nausea and/or Vomiting  acetaminophen   Tablet 650 milliGRAM(s) Oral every 6 hours PRN For Temp greater than 38 C (100.4 F)  dextrose Gel 1 Dose(s) Oral once PRN Blood Glucose LESS THAN 70 milliGRAM(s)/deciliter  glucagon  Injectable 1 milliGRAM(s) IntraMuscular once PRN Glucose LESS THAN 70 milligrams/deciliter      Allergies    No Known Allergies    Intolerances        REVIEW OF SYSTEMS:  CONSTITUTIONAL: No fever, weight loss, or fatigue  EYES: No eye pain, visual disturbances, or discharge  ENMT:  No difficulty hearing, tinnitus, vertigo; No sinus or throat pain  NECK: No pain or stiffness  RESPIRATORY: No cough, wheezing, chills or hemoptysis; No shortness of breath  CARDIOVASCULAR: No chest pain, palpitations, dizziness, or leg swelling  GASTROINTESTINAL: No abdominal or epigastric pain. No nausea, vomiting, or hematemesis; No diarrhea or constipation. No melena or hematochezia.  GENITOURINARY: No dysuria, frequency, hematuria, or incontinence  NEUROLOGICAL: No headaches, memory loss, loss of strength, numbness, or tremors  SKIN: No itching, burning, rashes, or lesions   LYMPH NODES: No enlarged glands  ENDOCRINE: No heat or cold intolerance; No hair loss; No polydipsia or polyuria  MUSCULOSKELETAL: No joint pain or swelling; No muscle, back, or extremity pain  PSYCHIATRIC: No depression, anxiety, mood swings, or difficulty sleeping  HEME/LYMPH: No easy bruising, or bleeding gums  ALLERGY AND IMMUNOLOGIC: No hives or eczema    Vital Signs Last 24 Hrs  T(C): 37.1 (06 Sep 2017 14:43), Max: 37.1 (06 Sep 2017 05:26)  T(F): 98.7 (06 Sep 2017 14:43), Max: 98.7 (06 Sep 2017 05:26)  HR: 88 (06 Sep 2017 14:43) (67 - 88)  BP: 110/65 (06 Sep 2017 14:43) (110/65 - 114/72)  BP(mean): --  RR: 18 (06 Sep 2017 14:43) (17 - 18)  SpO2: 95% (06 Sep 2017 14:43) (95% - 96%)    PHYSICAL EXAM:  GENERAL: NAD, well-groomed, well-developed  HEAD:  Atraumatic, Normocephalic  EYES: EOMI, PERRLA, conjunctiva and sclera clear  ENMT: No tonsillar erythema, exudates, or enlargement; Moist mucous membranes, Good dentition, No lesions  NECK: Supple, No JVD, Normal thyroid  NERVOUS SYSTEM:  Alert & Oriented X3, Good concentration; Motor Strength 5/5 B/L upper and lower extremities; DTRs 2+ intact and symmetric  CHEST/LUNG: Clear to auscultation bilaterally; No rales, rhonchi, wheezing, or rubs  HEART: Regular rate and rhythm; No murmurs, rubs, or gallops  ABDOMEN: Soft, Nontender, Nondistended; Bowel sounds present  EXTREMITIES:  2+ Peripheral Pulses, No clubbing, cyanosis, or edema  LYMPH: No lymphadenopathy noted  SKIN: No rashes or lesions    LABS:          PT/INR - ( 05 Sep 2017 06:38 )   PT: 12.6 sec;   INR: 1.15 ratio         PTT - ( 05 Sep 2017 06:38 )  PTT:30.3 sec    CAPILLARY BLOOD GLUCOSE  288 (06 Sep 2017 16:38)  207 (06 Sep 2017 12:00)  178 (06 Sep 2017 08:14)  192 (06 Sep 2017 00:35)  296 (05 Sep 2017 20:32)          RADIOLOGY & ADDITIONAL TESTS:    Notes Reviewed:  [x ] YES  [ ] NO    Care Discussed with Consultants/Other Providers [x ] YES  [ ] NO

## 2017-09-06 NOTE — PROGRESS NOTE ADULT - PROBLEM SELECTOR PLAN 5
continue statin
hold oral meds  riss  strict glycemic control
hold oral meds  riss  strict glycemic control
continue statin
hold oral meds  riss  strict glycemic control

## 2017-09-06 NOTE — PROGRESS NOTE ADULT - PROBLEM SELECTOR PROBLEM 5
Diabetes mellitus
Diabetes mellitus
Elevated cholesterol
Diabetes mellitus
Elevated cholesterol

## 2017-09-06 NOTE — PROGRESS NOTE ADULT - PROBLEM SELECTOR PLAN 2
cellulitis of back  check final cultures  iv abx per ID Dr. VELEZ    id eval with Dr. REYES / ROSIE appreciated  monitor and trend lactate  trend labs and temp
cellulitis of back  check final cultures  iv abx per ID Dr. VELEZ    id eval with Dr. REYES / ROSIE appreciated  monitor and trend lactate  trend labs and temp
pain control, neuro eval with dr. espino appreciated  likely from cellulitis  may need lidoderm patch - watch over next 24 hrs
IR drainage 9/5 with 2ml of pus noted so will consider 9/5 date for antibiotic duration after 'site control' so minimum of 6 weeks from 9/5 or until 10/17 but with ESR>100 would reassess with imaging and ESR prior to stopping abx.      please have pt schedule to be seen in my office in the next few weeks.  [phone]
cellulitis of back  check final cultures  iv abx per ID Dr. VELEZ    id eval with Dr. REYES / ROSIE appreciated  monitor and trend lactate  trend labs and temp
pain control, neuro eval  likely from cellulitis  may need lidoderm patch - watch over next 24 hrs
pain control, neuro eval with dr. espino  likely from cellulitis  may need lidoderm patch - watch over next 24 hrs
pain control, neuro eval with dr. espino appreciated  likely from cellulitis  may need lidoderm patch - watch over next 24 hrs
IR drainage 9/5 with 2ml of pus noted so will consider 9/5 date for antibiotic duration after 'site control' so minimum of 6 weeks from 9/5 or until 10/17 but with ESR>100 would reassess with imaging and ESR prior to stopping abx.  Can look at transition to outpatient setting when safe discharge possible but could continue IV Ancef with PICC line in outpt setting from ID standpoint.  Will leave Abx script in paper chart.

## 2017-09-06 NOTE — DISCHARGE NOTE ADULT - CARE PLAN
Principal Discharge DX:	MSSA (methicillin susceptible Staphylococcus aureus) septicemia  Goal:	free from infection  Instructions for follow-up, activity and diet:	follow up with ID MD Dr. VELEZ  Secondary Diagnosis:	Abscess of paraspinal muscles  Secondary Diagnosis:	Cellulitis  Secondary Diagnosis:	Diabetes mellitus  Secondary Diagnosis:	Elevated cholesterol  Secondary Diagnosis:	Hypertension Principal Discharge DX:	MSSA (methicillin susceptible Staphylococcus aureus) septicemia  Goal:	free from infection  Instructions for follow-up, activity and diet:	follow up with ID MD Dr. VELEZ next wednesday  Secondary Diagnosis:	Abscess of paraspinal muscles  Secondary Diagnosis:	Cellulitis  Secondary Diagnosis:	Diabetes mellitus  Secondary Diagnosis:	Elevated cholesterol  Secondary Diagnosis:	Hypertension

## 2017-09-06 NOTE — PROGRESS NOTE ADULT - NSHPATTENDINGPLANDISCUSS_GEN_ALL_CORE
patient and RN
patient and RN
patient and DC planner
patient and RN
patient and RN
patient and RN and ortho house staff

## 2017-09-06 NOTE — PROGRESS NOTE ADULT - PROBLEM SELECTOR PLAN 9
needs 6 weeks abx  PICC in place
resolved with tylenol
needs 6 weeks abx  PICC in place
resolved with tylenol
resolved with tylenol
needs 6 weeks abx  PICC in place

## 2017-09-06 NOTE — PROGRESS NOTE ADULT - PROBLEM SELECTOR PLAN 6
continue statin
continue statin
from infection  fluid rescusitation  improved
continue statin
from infection  fluid rescusitation
from infection  fluid rescusitation  improved

## 2017-09-07 VITALS
HEART RATE: 77 BPM | TEMPERATURE: 98 F | RESPIRATION RATE: 18 BRPM | SYSTOLIC BLOOD PRESSURE: 143 MMHG | DIASTOLIC BLOOD PRESSURE: 83 MMHG | OXYGEN SATURATION: 100 %

## 2017-09-07 RX ORDER — INFLUENZA VIRUS VACCINE 15; 15; 15; 15 UG/.5ML; UG/.5ML; UG/.5ML; UG/.5ML
0.5 SUSPENSION INTRAMUSCULAR ONCE
Qty: 0 | Refills: 0 | Status: DISCONTINUED | OUTPATIENT
Start: 2017-09-07 | End: 2017-09-07

## 2017-09-07 RX ORDER — INFLUENZA VIRUS VACCINE 15; 15; 15; 15 UG/.5ML; UG/.5ML; UG/.5ML; UG/.5ML
0.5 SUSPENSION INTRAMUSCULAR ONCE
Qty: 0 | Refills: 0 | Status: COMPLETED | OUTPATIENT
Start: 2017-09-07 | End: 2017-09-07

## 2017-09-07 RX ADMIN — Medication 1: at 08:05

## 2017-09-07 RX ADMIN — LISINOPRIL 20 MILLIGRAM(S): 2.5 TABLET ORAL at 05:56

## 2017-09-07 RX ADMIN — GABAPENTIN 100 MILLIGRAM(S): 400 CAPSULE ORAL at 05:56

## 2017-09-07 RX ADMIN — Medication 100 MILLIGRAM(S): at 05:56

## 2017-09-07 RX ADMIN — INFLUENZA VIRUS VACCINE 0.5 MILLILITER(S): 15; 15; 15; 15 SUSPENSION INTRAMUSCULAR at 08:17

## 2017-09-07 NOTE — CHART NOTE - NSCHARTNOTEFT_GEN_A_CORE
Do you have Advance Directives (HCP / LV / Organ donation / Documentation of oral advance Directive):   (    )  yes    ( x     )    NO                                                                            Do you have LV - Living will :                                                                                                                                             (    )  yes    ( x     )   No    Do you have HCP - Health Care Proxy:                                                                                                                            (     )  yes   (     x  ) N0    Do you have DNR- Do Not Resuscitate :                                                                                                                           (      )  yes  (     x   )  No    Do you have DNI- Do Not intubate  :                                                                                                                               (      )  yes   (    x   ) No    Do you have MOLST - Medical orders for Life sustaining treatment  :                                                                    (      ) yes    (    x   ) No    Decision Maker :  (  x   ) Patient     (      )  [facility]   (     ) Public Health Law Surrogate     (      ) Surrogate  (       ) Guardian    Goals of Care :  (   x   )   Complete Care     (       ) No Limitations                              (       )   Comfort Care       (       )  Hospice                               (      )   Limited medical Intervention / s    Medical Interventions :   (  x      )   CPR       (        )  DNR                                               (    x    )  Intubation with MV - Mechanical Ventilation  (   x   ) BIPAP/CPAP    (         )   DNI                                               (     x    )  Artificial Nutrition -  IVF, TPN / PPN, Tube Feeds             (         )   No Feeding Tube                                                (    x    ) Use Antibiotics                         (          ) No Antibiotics                                                (   x      ) Blood and Blood Products     (         )   No Blood or Blood products                                                (    x     )  Dialysis                                    (         )  No Dialysis                                                (          )  Medical Management only  (         )  No Invasive Interventions or Surgery  Time spent :                        (       ) upto 30 minutes                       (        x   )   more than 30 minutes  Goals of care by palliative Rn reviewed and discussed with patient again

## 2017-09-10 LAB
CULTURE RESULTS: SIGNIFICANT CHANGE UP
SPECIMEN SOURCE: SIGNIFICANT CHANGE UP

## 2017-09-11 DIAGNOSIS — L03.312 CELLULITIS OF BACK [ANY PART EXCEPT BUTTOCK]: ICD-10-CM

## 2017-09-11 DIAGNOSIS — C44.91 BASAL CELL CARCINOMA OF SKIN, UNSPECIFIED: ICD-10-CM

## 2017-09-11 DIAGNOSIS — M54.16 RADICULOPATHY, LUMBAR REGION: ICD-10-CM

## 2017-09-11 DIAGNOSIS — I10 ESSENTIAL (PRIMARY) HYPERTENSION: ICD-10-CM

## 2017-09-11 DIAGNOSIS — M86.8X8 OTHER OSTEOMYELITIS, OTHER SITE: ICD-10-CM

## 2017-09-11 DIAGNOSIS — E11.69 TYPE 2 DIABETES MELLITUS WITH OTHER SPECIFIED COMPLICATION: ICD-10-CM

## 2017-09-11 DIAGNOSIS — A41.01 SEPSIS DUE TO METHICILLIN SUSCEPTIBLE STAPHYLOCOCCUS AUREUS: ICD-10-CM

## 2017-09-11 DIAGNOSIS — E78.5 HYPERLIPIDEMIA, UNSPECIFIED: ICD-10-CM

## 2017-09-11 DIAGNOSIS — L02.212 CUTANEOUS ABSCESS OF BACK [ANY PART, EXCEPT BUTTOCK]: ICD-10-CM

## 2017-09-11 DIAGNOSIS — I08.1 RHEUMATIC DISORDERS OF BOTH MITRAL AND TRICUSPID VALVES: ICD-10-CM

## 2017-09-11 DIAGNOSIS — M19.90 UNSPECIFIED OSTEOARTHRITIS, UNSPECIFIED SITE: ICD-10-CM

## 2017-09-11 DIAGNOSIS — M60.08 INFECTIVE MYOSITIS, OTHER SITE: ICD-10-CM

## 2017-09-11 DIAGNOSIS — M41.9 SCOLIOSIS, UNSPECIFIED: ICD-10-CM

## 2017-09-11 DIAGNOSIS — M62.830 MUSCLE SPASM OF BACK: ICD-10-CM

## 2017-10-19 PROCEDURE — 36569 INSJ PICC 5 YR+ W/O IMAGING: CPT

## 2017-10-19 PROCEDURE — 87040 BLOOD CULTURE FOR BACTERIA: CPT

## 2017-10-19 PROCEDURE — 87205 SMEAR GRAM STAIN: CPT

## 2017-10-19 PROCEDURE — 77001 FLUOROGUIDE FOR VEIN DEVICE: CPT

## 2017-10-19 PROCEDURE — 85027 COMPLETE CBC AUTOMATED: CPT

## 2017-10-19 PROCEDURE — 90686 IIV4 VACC NO PRSV 0.5 ML IM: CPT

## 2017-10-19 PROCEDURE — 72158 MRI LUMBAR SPINE W/O & W/DYE: CPT

## 2017-10-19 PROCEDURE — 93005 ELECTROCARDIOGRAM TRACING: CPT

## 2017-10-19 PROCEDURE — 71045 X-RAY EXAM CHEST 1 VIEW: CPT

## 2017-10-19 PROCEDURE — 87070 CULTURE OTHR SPECIMN AEROBIC: CPT

## 2017-10-19 PROCEDURE — 85652 RBC SED RATE AUTOMATED: CPT

## 2017-10-19 PROCEDURE — 76937 US GUIDE VASCULAR ACCESS: CPT

## 2017-10-19 PROCEDURE — 96365 THER/PROPH/DIAG IV INF INIT: CPT

## 2017-10-19 PROCEDURE — 87086 URINE CULTURE/COLONY COUNT: CPT

## 2017-10-19 PROCEDURE — 85730 THROMBOPLASTIN TIME PARTIAL: CPT

## 2017-10-19 PROCEDURE — 83036 HEMOGLOBIN GLYCOSYLATED A1C: CPT

## 2017-10-19 PROCEDURE — 80048 BASIC METABOLIC PNL TOTAL CA: CPT

## 2017-10-19 PROCEDURE — C1751: CPT

## 2017-10-19 PROCEDURE — 83605 ASSAY OF LACTIC ACID: CPT

## 2017-10-19 PROCEDURE — 87075 CULTR BACTERIA EXCEPT BLOOD: CPT

## 2017-10-19 PROCEDURE — 77012 CT SCAN FOR NEEDLE BIOPSY: CPT

## 2017-10-19 PROCEDURE — 10160 PNXR ASPIR ABSC HMTMA BULLA: CPT

## 2017-10-19 PROCEDURE — 93306 TTE W/DOPPLER COMPLETE: CPT

## 2017-10-19 PROCEDURE — 81003 URINALYSIS AUTO W/O SCOPE: CPT

## 2017-10-19 PROCEDURE — 99285 EMERGENCY DEPT VISIT HI MDM: CPT | Mod: 25

## 2017-10-19 PROCEDURE — 87150 DNA/RNA AMPLIFIED PROBE: CPT

## 2017-10-19 PROCEDURE — 87186 SC STD MICRODIL/AGAR DIL: CPT

## 2017-10-19 PROCEDURE — 83735 ASSAY OF MAGNESIUM: CPT

## 2017-10-19 PROCEDURE — 85610 PROTHROMBIN TIME: CPT

## 2017-10-19 PROCEDURE — 70543 MRI ORBT/FAC/NCK W/O &W/DYE: CPT

## 2017-10-19 PROCEDURE — 96375 TX/PRO/DX INJ NEW DRUG ADDON: CPT

## 2017-10-19 PROCEDURE — 72100 X-RAY EXAM L-S SPINE 2/3 VWS: CPT

## 2017-10-19 PROCEDURE — A9579: CPT

## 2017-10-19 PROCEDURE — 80053 COMPREHEN METABOLIC PANEL: CPT

## 2018-03-10 ENCOUNTER — EMERGENCY (EMERGENCY)
Facility: HOSPITAL | Age: 73
LOS: 1 days | Discharge: HOME | End: 2018-03-10
Admitting: INTERNAL MEDICINE
Payer: MEDICARE

## 2018-03-10 DIAGNOSIS — Z79.899 OTHER LONG TERM (CURRENT) DRUG THERAPY: ICD-10-CM

## 2018-03-10 DIAGNOSIS — Z79.84 LONG TERM (CURRENT) USE OF ORAL HYPOGLYCEMIC DRUGS: ICD-10-CM

## 2018-03-10 DIAGNOSIS — Z79.82 LONG TERM (CURRENT) USE OF ASPIRIN: ICD-10-CM

## 2018-03-10 DIAGNOSIS — H92.22 OTORRHAGIA, LEFT EAR: ICD-10-CM
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
## 2018-03-10 DIAGNOSIS — I10 ESSENTIAL (PRIMARY) HYPERTENSION: ICD-10-CM
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
## 2018-03-10 DIAGNOSIS — Z98.89 OTHER SPECIFIED POSTPROCEDURAL STATES: Chronic | ICD-10-CM
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
## 2018-03-10 DIAGNOSIS — E11.9 TYPE 2 DIABETES MELLITUS WITHOUT COMPLICATIONS: ICD-10-CM

## 2018-03-10 DIAGNOSIS — Z87.891 PERSONAL HISTORY OF NICOTINE DEPENDENCE: ICD-10-CM

## 2018-03-10 DIAGNOSIS — Z90.710 ACQUIRED ABSENCE OF BOTH CERVIX AND UTERUS: Chronic | ICD-10-CM

## 2018-03-10 DIAGNOSIS — S01.302A UNSPECIFIED OPEN WOUND OF LEFT EAR, INITIAL ENCOUNTER: ICD-10-CM

## 2018-03-10 PROCEDURE — G0168: CPT

## 2018-03-10 PROCEDURE — 12011 RPR F/E/E/N/L/M 2.5 CM/<: CPT

## 2018-03-10 PROCEDURE — 99283 EMERGENCY DEPT VISIT LOW MDM: CPT | Mod: 25

## 2018-03-10 PROCEDURE — 99282 EMERGENCY DEPT VISIT SF MDM: CPT | Mod: 25

## 2018-06-19 ENCOUNTER — OUTPATIENT (OUTPATIENT)
Dept: OUTPATIENT SERVICES | Facility: HOSPITAL | Age: 73
LOS: 1 days | End: 2018-06-19
Payer: MEDICARE

## 2018-06-19 ENCOUNTER — APPOINTMENT (OUTPATIENT)
Dept: MAMMOGRAPHY | Facility: HOSPITAL | Age: 73
End: 2018-06-19
Payer: MEDICARE

## 2018-06-19 DIAGNOSIS — Z00.8 ENCOUNTER FOR OTHER GENERAL EXAMINATION: ICD-10-CM

## 2018-06-19 DIAGNOSIS — Z98.89 OTHER SPECIFIED POSTPROCEDURAL STATES: Chronic | ICD-10-CM

## 2018-06-19 DIAGNOSIS — Z90.710 ACQUIRED ABSENCE OF BOTH CERVIX AND UTERUS: Chronic | ICD-10-CM

## 2018-06-19 PROCEDURE — 77067 SCR MAMMO BI INCL CAD: CPT | Mod: 26

## 2018-06-19 PROCEDURE — 77063 BREAST TOMOSYNTHESIS BI: CPT

## 2018-06-19 PROCEDURE — 77067 SCR MAMMO BI INCL CAD: CPT

## 2018-06-19 PROCEDURE — 77063 BREAST TOMOSYNTHESIS BI: CPT | Mod: 26

## 2018-07-12 NOTE — ED ADULT NURSE NOTE - BREATHING
Return to WORK    July 12, 2018      Re:   Yasmin Curry  [address] [facility] [address]           This is to certify that Yasmin Curry was seen in Urgent Care and can return to WORK on 7/13/2018.    RESTRICTIONS: no working with food for 3 days          SIGNATURE: ___________________________________________,   7/12/2018  MD Nano Simpson MD  [facility]  [address] [facility] [id number] [phone]   spontaneous

## 2019-04-15 ENCOUNTER — APPOINTMENT (OUTPATIENT)
Dept: SURGERY | Facility: CLINIC | Age: 74
End: 2019-04-15
Payer: MEDICARE

## 2019-04-15 VITALS — WEIGHT: 195 LBS | BODY MASS INDEX: 32.49 KG/M2 | HEIGHT: 65 IN

## 2019-04-15 DIAGNOSIS — Z12.11 ENCOUNTER FOR SCREENING FOR MALIGNANT NEOPLASM OF COLON: ICD-10-CM

## 2019-04-15 DIAGNOSIS — J45.909 UNSPECIFIED ASTHMA, UNCOMPLICATED: ICD-10-CM

## 2019-04-15 DIAGNOSIS — Z86.018 PERSONAL HISTORY OF OTHER BENIGN NEOPLASM: ICD-10-CM

## 2019-04-15 PROCEDURE — 99202 OFFICE O/P NEW SF 15 MIN: CPT

## 2019-04-15 NOTE — PHYSICAL EXAM
[Normal Breath Sounds] : Normal breath sounds [Normal Heart Sounds] : normal heart sounds [Normal Rate and Rhythm] : normal rate and rhythm [Abdominal Masses] : No abdominal masses [Abdomen Tenderness] : ~T ~M No abdominal tenderness [No Rash or Lesion] : No rash or lesion [de-identified] : nl [de-identified] : nl [de-identified] : nl

## 2019-04-15 NOTE — HISTORY OF PRESENT ILLNESS
[de-identified] : this 73 year old woman last underwent a colonoscopy over five years ago. There is no FH of colon CA. The patient is asymptomatic regarding her GI tract. The patient has a history of colonic polyps.

## 2019-04-15 NOTE — ASSESSMENT
[FreeTextEntry1] : Long discussion regarding all options and risks\par Bowel prep written and explained\par Scheduled for colonoscopy on 5/21/19

## 2019-05-20 ENCOUNTER — TRANSCRIPTION ENCOUNTER (OUTPATIENT)
Age: 74
End: 2019-05-20

## 2019-05-21 ENCOUNTER — OUTPATIENT (OUTPATIENT)
Dept: OUTPATIENT SERVICES | Facility: HOSPITAL | Age: 74
LOS: 1 days | End: 2019-05-21
Payer: MEDICARE

## 2019-05-21 DIAGNOSIS — Z12.11 ENCOUNTER FOR SCREENING FOR MALIGNANT NEOPLASM OF COLON: ICD-10-CM

## 2019-05-21 DIAGNOSIS — Z86.010 PERSONAL HISTORY OF COLONIC POLYPS: ICD-10-CM

## 2019-05-21 DIAGNOSIS — Z90.710 ACQUIRED ABSENCE OF BOTH CERVIX AND UTERUS: Chronic | ICD-10-CM

## 2019-05-21 DIAGNOSIS — Z98.89 OTHER SPECIFIED POSTPROCEDURAL STATES: Chronic | ICD-10-CM

## 2019-05-21 LAB — GLUCOSE BLDC GLUCOMTR-MCNC: 121 MG/DL — HIGH (ref 70–99)

## 2019-05-21 PROCEDURE — G0105: CPT

## 2019-05-21 PROCEDURE — G0121 COLON CA SCRN NOT HI RSK IND: CPT

## 2019-05-21 PROCEDURE — 82962 GLUCOSE BLOOD TEST: CPT

## 2019-05-21 RX ORDER — SODIUM CHLORIDE 9 MG/ML
1000 INJECTION INTRAMUSCULAR; INTRAVENOUS; SUBCUTANEOUS
Refills: 0 | Status: DISCONTINUED | OUTPATIENT
Start: 2019-05-21 | End: 2019-06-05

## 2019-05-21 RX ADMIN — SODIUM CHLORIDE 75 MILLILITER(S): 9 INJECTION INTRAMUSCULAR; INTRAVENOUS; SUBCUTANEOUS at 10:51

## 2019-09-13 ENCOUNTER — EMERGENCY (EMERGENCY)
Facility: HOSPITAL | Age: 74
LOS: 1 days | Discharge: HOME | End: 2019-09-13
Attending: INTERNAL MEDICINE | Admitting: INTERNAL MEDICINE
Payer: MEDICARE

## 2019-09-13 VITALS
OXYGEN SATURATION: 97 % | RESPIRATION RATE: 16 BRPM | SYSTOLIC BLOOD PRESSURE: 128 MMHG | DIASTOLIC BLOOD PRESSURE: 82 MMHG | HEART RATE: 86 BPM

## 2019-09-13 VITALS
DIASTOLIC BLOOD PRESSURE: 91 MMHG | SYSTOLIC BLOOD PRESSURE: 137 MMHG | RESPIRATION RATE: 18 BRPM | OXYGEN SATURATION: 96 % | WEIGHT: 190.04 LBS | HEART RATE: 114 BPM | HEIGHT: 65 IN | TEMPERATURE: 98 F

## 2019-09-13 DIAGNOSIS — Z98.89 OTHER SPECIFIED POSTPROCEDURAL STATES: Chronic | ICD-10-CM

## 2019-09-13 DIAGNOSIS — Z90.710 ACQUIRED ABSENCE OF BOTH CERVIX AND UTERUS: Chronic | ICD-10-CM

## 2019-09-13 LAB
APPEARANCE UR: CLEAR — SIGNIFICANT CHANGE UP
BACTERIA # UR AUTO: ABNORMAL /HPF
BILIRUB UR-MCNC: NEGATIVE — SIGNIFICANT CHANGE UP
COLOR SPEC: YELLOW — SIGNIFICANT CHANGE UP
DIFF PNL FLD: ABNORMAL
EPI CELLS # UR: SIGNIFICANT CHANGE UP
GLUCOSE BLDC GLUCOMTR-MCNC: 193 MG/DL — HIGH (ref 70–99)
GLUCOSE UR QL: 1000 MG/DL
KETONES UR-MCNC: NEGATIVE — SIGNIFICANT CHANGE UP
LEUKOCYTE ESTERASE UR-ACNC: ABNORMAL
NITRITE UR-MCNC: NEGATIVE — SIGNIFICANT CHANGE UP
PH UR: 6 — SIGNIFICANT CHANGE UP (ref 5–8)
PROT UR-MCNC: 30 MG/DL
RBC CASTS # UR COMP ASSIST: >50 /HPF (ref 0–4)
SP GR SPEC: 1.01 — SIGNIFICANT CHANGE UP (ref 1.01–1.02)
UROBILINOGEN FLD QL: NEGATIVE — SIGNIFICANT CHANGE UP
WBC UR QL: ABNORMAL /HPF (ref 0–5)

## 2019-09-13 PROCEDURE — 81001 URINALYSIS AUTO W/SCOPE: CPT

## 2019-09-13 PROCEDURE — 99283 EMERGENCY DEPT VISIT LOW MDM: CPT

## 2019-09-13 PROCEDURE — 87086 URINE CULTURE/COLONY COUNT: CPT

## 2019-09-13 PROCEDURE — 82962 GLUCOSE BLOOD TEST: CPT

## 2019-09-13 RX ORDER — CEFUROXIME AXETIL 250 MG
1 TABLET ORAL
Qty: 20 | Refills: 0
Start: 2019-09-13 | End: 2019-09-22

## 2019-09-13 RX ORDER — CEFUROXIME AXETIL 250 MG
500 TABLET ORAL EVERY 12 HOURS
Refills: 0 | Status: DISCONTINUED | OUTPATIENT
Start: 2019-09-13 | End: 2019-09-18

## 2019-09-13 RX ADMIN — Medication 500 MILLIGRAM(S): at 19:08

## 2019-09-13 NOTE — ED PROVIDER NOTE - OBJECTIVE STATEMENT
Pt is 73 y/o female with PMH of DM and HTN here for eval of dysuria, urinary urgency and frequency as well as sensation of suprapubic pressure and hematuria that started earlier today, There is no fever, chills, denies abd pain, flank pain or lower back pain, denies cp, sob, nausea or vomiting.

## 2019-09-13 NOTE — ED PROVIDER NOTE - CLINICAL SUMMARY MEDICAL DECISION MAKING FREE TEXT BOX
Pt is 73 y/o female with PMH of DM and HTN here for eval of dysuria, urinary urgency and frequency as well as sensation of suprapubic pressure and hematuria that started earlier today, There is no fever, chills, denies abd pain, flank pain or lower back pain, denies cp, sob, nausea or vomiting. Pt noted to have FS of 193, on urinalysis moderate leuks, large blood and WBCs, afebrile and very well appearing - will start on cefuroxime with urine culture pending;

## 2019-09-13 NOTE — ED PROVIDER NOTE - ATTENDING CONTRIBUTION TO CARE
74 y f cc urinary urgency frequency recently started on jardiance , no back pain , no change in ms  General:     NAD, well-nourished, well-appearing  Head:     NC/AT, EOMI, oral mucosa moist  Neck:     trachea midline  Lungs:     CTA b/l, no w/r/r  CVS:     S1S2, RRR, no m/g/r  Abd:     +BS, s/nt/nd, no organomegaly  Ext:    2+ radial and pedal pulses, no c/c/e  Neuro: AAOx3, no sensory/motor deficits  ua + leuks neg nitrite , cx sent   rx with po abx  Dr. Breen:  I have reviewed and discussed with the PA/ resident the case specifics, including the history, physical assessment, evaluation, conclusion, laboratory results, and medical plan. I agree with the contents, and conclusions. I have personally examined, and interviewed the patient.

## 2019-09-13 NOTE — ED ADULT NURSE NOTE - OBJECTIVE STATEMENT
Patient presents to ED complaining of worsening pain with urination since this AM. Patient states painful urination is associated with urinary frequency. She denies back pain, denies fevers, denies N/V.

## 2019-09-13 NOTE — ED PROVIDER NOTE - NSFOLLOWUPINSTRUCTIONS_ED_ALL_ED_FT
PLEASE DRINK PLENTY OF FLUIDS, TAKE THE ANTIBIOTICS AS PRESCRIBED - FINISH THE FULL COURSE EVEN IF YOU START FEELING BETTER. IF THE ANTIBIOTICS DON'T WORK AGAINST THE BACTERIA IN URINE, WE WILL GIVE YOU A CALL AND CHANGE THEM. FOLLOW UP WITH YOUR DOCTOR WITHIN NEXT 4 DAYS. RETURN TO ER FOR FEVER, ABDOMINAL/LOWER BACK PAIN, NAUSEA, VOMITING, FEELING SICK.  Urinary Tract Infection in Women    WHAT YOU NEED TO KNOW:    What is a urinary tract infection (UTI)? A UTI is caused by bacteria that get inside your urinary tract. Your urinary tract includes your kidneys, ureters, bladder, and urethra. Urine is made in your kidneys, and it flows from the ureters to the bladder. Urine leaves the bladder through the urethra. A UTI is more common in your lower urinary tract, which includes your bladder and urethra. Female Urinary System         What increases my risk for a UTI?     A urinary catheter or self-catheterization      Pregnancy      Urinary tract problems, such as a narrowing, kidney stones, or inability to empty your bladder completely      History of a UTI      Sexual intercourse      Menopause      Diabetes or obesity    What are the signs and symptoms of a UTI?     Urinating more often than usual, leaking urine, or waking from sleep to urinate      Pain or burning when you urinate      Pain or pressure in your lower abdomen       Urine that smells bad      Blood in your urine    How is a UTI diagnosed? Your healthcare provider will ask about your signs and symptoms. Your provider may press on your abdomen, sides, and back to check if you feel pain. Your urine will be tested for bacteria that may be causing your infection. If you have UTIs often, you may need more tests to find the cause.    How is a UTI treated?     Antibiotics help fight a bacterial infection. If you have UTIs often (called recurrent UTIs), you may be given antibiotics to take regularly. You will be given directions for when and how to use antibiotics. The goal is to prevent UTIs but not cause antibiotic resistance by using antibiotics too often.      Medicines may be given to decrease pain and burning when you urinate. They will also help decrease the feeling that you need to urinate often. These medicines will make your urine orange or red.    What can I do to prevent a UTI?     Talk to your healthcare provider about your birth control method. You may need to change your method if it is increasing your risk for UTIs.      Empty your bladder often. Urinate and empty your bladder as soon as you feel the need. Do not hold your urine for long periods of time.      Wipe from front to back after you urinate or have a bowel movement. This will help prevent germs from getting into your urinary tract through your urethra.      Drink liquids as directed. Ask how much liquid to drink each day and which liquids are best for you. You may need to drink more liquids than usual to help flush out the bacteria. Do not drink alcohol, caffeine, or citrus juices. These can irritate your bladder and increase your symptoms. Your healthcare provider may recommend cranberry juice to help prevent a UTI.      Urinate after you have sex. This can help flush out bacteria passed during sex.      Do not douche or use feminine deodorants. These can change the chemical balance in your vagina.      Change sanitary pads or tampons often. This will help prevent germs from getting into your urinary tract.       Talk to your healthcare provider about your birth control method. You may need to change your method if it is increasing your risk for UTIs.      Wear cotton underwear and clothes that are loose. Tight pants and nylon underwear can trap moisture and cause bacteria to grow.      Vaginal estrogen may be recommended. This medicine helps prevent UTIs in women who have gone through menopause or are in baldemar-menopause.      Do pelvic muscle exercises often. Pelvic muscle exercises may help you start and stop urinating. Strong pelvic muscles may help you empty your bladder easier. Squeeze these muscles tightly for 5 seconds like you are trying to hold back urine. Then relax for 5 seconds. Gradually work up to squeezing for 10 seconds. Do 3 sets of 15 repetitions a day, or as directed.    When should I seek immediate care?     You are urinating very little or not at all.      You have a high fever with shaking chills.       You have side or back pain that gets worse.    When should I call my doctor?     You have a mild fever.      You do not feel better after 2 days of taking antibiotics.      You have new symptoms, such as blood or pus in your urine.       You are vomiting.       You have questions or concerns about your condition or care.

## 2019-09-13 NOTE — ED PROVIDER NOTE - PATIENT PORTAL LINK FT
You can access the FollowMyHealth Patient Portal offered by [facility] by registering at the following website: [email or web address]. By joining [facility]’s FollowMyHealth portal, you will also be able to view your health information using other applications (apps) compatible with our system.

## 2019-09-15 LAB
CULTURE RESULTS: SIGNIFICANT CHANGE UP
SPECIMEN SOURCE: SIGNIFICANT CHANGE UP

## 2019-10-10 ENCOUNTER — OUTPATIENT (OUTPATIENT)
Dept: OUTPATIENT SERVICES | Facility: HOSPITAL | Age: 74
LOS: 1 days | End: 2019-10-10
Payer: MEDICARE

## 2019-10-10 ENCOUNTER — APPOINTMENT (OUTPATIENT)
Dept: MAMMOGRAPHY | Facility: HOSPITAL | Age: 74
End: 2019-10-10
Payer: MEDICARE

## 2019-10-10 DIAGNOSIS — Z98.89 OTHER SPECIFIED POSTPROCEDURAL STATES: Chronic | ICD-10-CM

## 2019-10-10 DIAGNOSIS — Z00.8 ENCOUNTER FOR OTHER GENERAL EXAMINATION: ICD-10-CM

## 2019-10-10 DIAGNOSIS — Z90.710 ACQUIRED ABSENCE OF BOTH CERVIX AND UTERUS: Chronic | ICD-10-CM

## 2019-10-10 PROCEDURE — 77063 BREAST TOMOSYNTHESIS BI: CPT | Mod: 26

## 2019-10-10 PROCEDURE — 77063 BREAST TOMOSYNTHESIS BI: CPT

## 2019-10-10 PROCEDURE — 77067 SCR MAMMO BI INCL CAD: CPT | Mod: 26

## 2019-10-10 PROCEDURE — 77067 SCR MAMMO BI INCL CAD: CPT

## 2020-01-26 ENCOUNTER — EMERGENCY (EMERGENCY)
Facility: HOSPITAL | Age: 75
LOS: 1 days | Discharge: HOME | End: 2020-01-26
Attending: INTERNAL MEDICINE | Admitting: INTERNAL MEDICINE
Payer: MEDICARE

## 2020-01-26 VITALS
OXYGEN SATURATION: 97 % | WEIGHT: 195.11 LBS | TEMPERATURE: 97 F | HEART RATE: 106 BPM | DIASTOLIC BLOOD PRESSURE: 82 MMHG | SYSTOLIC BLOOD PRESSURE: 151 MMHG | HEIGHT: 65 IN | RESPIRATION RATE: 17 BRPM

## 2020-01-26 DIAGNOSIS — Z98.89 OTHER SPECIFIED POSTPROCEDURAL STATES: Chronic | ICD-10-CM

## 2020-01-26 DIAGNOSIS — Z90.710 ACQUIRED ABSENCE OF BOTH CERVIX AND UTERUS: Chronic | ICD-10-CM

## 2020-01-26 LAB
ALBUMIN SERPL ELPH-MCNC: 3.7 G/DL — SIGNIFICANT CHANGE UP (ref 3.3–5)
ALP SERPL-CCNC: 64 U/L — SIGNIFICANT CHANGE UP (ref 40–120)
ALT FLD-CCNC: 57 U/L — HIGH (ref 10–45)
ANION GAP SERPL CALC-SCNC: 10 MMOL/L — SIGNIFICANT CHANGE UP (ref 5–17)
APPEARANCE UR: ABNORMAL
AST SERPL-CCNC: 23 U/L — SIGNIFICANT CHANGE UP (ref 10–40)
BACTERIA # UR AUTO: ABNORMAL /HPF
BASOPHILS # BLD AUTO: 0.03 K/UL — SIGNIFICANT CHANGE UP (ref 0–0.2)
BASOPHILS NFR BLD AUTO: 0.4 % — SIGNIFICANT CHANGE UP (ref 0–2)
BILIRUB SERPL-MCNC: 0.4 MG/DL — SIGNIFICANT CHANGE UP (ref 0.2–1.2)
BILIRUB UR-MCNC: NEGATIVE — SIGNIFICANT CHANGE UP
BUN SERPL-MCNC: 22 MG/DL — SIGNIFICANT CHANGE UP (ref 7–23)
CALCIUM SERPL-MCNC: 9.7 MG/DL — SIGNIFICANT CHANGE UP (ref 8.4–10.5)
CHLORIDE SERPL-SCNC: 105 MMOL/L — SIGNIFICANT CHANGE UP (ref 96–108)
CO2 SERPL-SCNC: 29 MMOL/L — SIGNIFICANT CHANGE UP (ref 22–31)
COLOR SPEC: YELLOW — SIGNIFICANT CHANGE UP
CREAT SERPL-MCNC: 0.71 MG/DL — SIGNIFICANT CHANGE UP (ref 0.5–1.3)
DIFF PNL FLD: ABNORMAL
EOSINOPHIL # BLD AUTO: 0.11 K/UL — SIGNIFICANT CHANGE UP (ref 0–0.5)
EOSINOPHIL NFR BLD AUTO: 1.4 % — SIGNIFICANT CHANGE UP (ref 0–6)
EPI CELLS # UR: SIGNIFICANT CHANGE UP
GLUCOSE SERPL-MCNC: 203 MG/DL — HIGH (ref 70–99)
GLUCOSE UR QL: 250
HCT VFR BLD CALC: 40.8 % — SIGNIFICANT CHANGE UP (ref 34.5–45)
HGB BLD-MCNC: 13.5 G/DL — SIGNIFICANT CHANGE UP (ref 11.5–15.5)
IMM GRANULOCYTES NFR BLD AUTO: 0.4 % — SIGNIFICANT CHANGE UP (ref 0–1.5)
KETONES UR-MCNC: NEGATIVE — SIGNIFICANT CHANGE UP
LEUKOCYTE ESTERASE UR-ACNC: ABNORMAL
LYMPHOCYTES # BLD AUTO: 1.34 K/UL — SIGNIFICANT CHANGE UP (ref 1–3.3)
LYMPHOCYTES # BLD AUTO: 16.9 % — SIGNIFICANT CHANGE UP (ref 13–44)
MCHC RBC-ENTMCNC: 29.7 PG — SIGNIFICANT CHANGE UP (ref 27–34)
MCHC RBC-ENTMCNC: 33.1 GM/DL — SIGNIFICANT CHANGE UP (ref 32–36)
MCV RBC AUTO: 89.9 FL — SIGNIFICANT CHANGE UP (ref 80–100)
MONOCYTES # BLD AUTO: 0.65 K/UL — SIGNIFICANT CHANGE UP (ref 0–0.9)
MONOCYTES NFR BLD AUTO: 8.2 % — SIGNIFICANT CHANGE UP (ref 2–14)
NEUTROPHILS # BLD AUTO: 5.78 K/UL — SIGNIFICANT CHANGE UP (ref 1.8–7.4)
NEUTROPHILS NFR BLD AUTO: 72.7 % — SIGNIFICANT CHANGE UP (ref 43–77)
NITRITE UR-MCNC: NEGATIVE — SIGNIFICANT CHANGE UP
NRBC # BLD: 0 /100 WBCS — SIGNIFICANT CHANGE UP (ref 0–0)
PH UR: 6.5 — SIGNIFICANT CHANGE UP (ref 5–8)
PLATELET # BLD AUTO: 210 K/UL — SIGNIFICANT CHANGE UP (ref 150–400)
POTASSIUM SERPL-MCNC: 4.2 MMOL/L — SIGNIFICANT CHANGE UP (ref 3.5–5.3)
POTASSIUM SERPL-SCNC: 4.2 MMOL/L — SIGNIFICANT CHANGE UP (ref 3.5–5.3)
PROT SERPL-MCNC: 7.3 G/DL — SIGNIFICANT CHANGE UP (ref 6–8.3)
PROT UR-MCNC: 30 MG/DL
RBC # BLD: 4.54 M/UL — SIGNIFICANT CHANGE UP (ref 3.8–5.2)
RBC # FLD: 12.7 % — SIGNIFICANT CHANGE UP (ref 10.3–14.5)
RBC CASTS # UR COMP ASSIST: ABNORMAL /HPF (ref 0–4)
SODIUM SERPL-SCNC: 144 MMOL/L — SIGNIFICANT CHANGE UP (ref 135–145)
SP GR SPEC: 1.01 — SIGNIFICANT CHANGE UP (ref 1.01–1.02)
UROBILINOGEN FLD QL: NEGATIVE — SIGNIFICANT CHANGE UP
WBC # BLD: 7.94 K/UL — SIGNIFICANT CHANGE UP (ref 3.8–10.5)
WBC # FLD AUTO: 7.94 K/UL — SIGNIFICANT CHANGE UP (ref 3.8–10.5)
WBC UR QL: ABNORMAL /HPF (ref 0–5)

## 2020-01-26 PROCEDURE — 96365 THER/PROPH/DIAG IV INF INIT: CPT

## 2020-01-26 PROCEDURE — 87086 URINE CULTURE/COLONY COUNT: CPT

## 2020-01-26 PROCEDURE — 36415 COLL VENOUS BLD VENIPUNCTURE: CPT

## 2020-01-26 PROCEDURE — 99284 EMERGENCY DEPT VISIT MOD MDM: CPT | Mod: 25

## 2020-01-26 PROCEDURE — 81001 URINALYSIS AUTO W/SCOPE: CPT

## 2020-01-26 PROCEDURE — 99284 EMERGENCY DEPT VISIT MOD MDM: CPT

## 2020-01-26 PROCEDURE — 80053 COMPREHEN METABOLIC PANEL: CPT

## 2020-01-26 PROCEDURE — 85027 COMPLETE CBC AUTOMATED: CPT

## 2020-01-26 RX ORDER — SODIUM CHLORIDE 9 MG/ML
1000 INJECTION INTRAMUSCULAR; INTRAVENOUS; SUBCUTANEOUS ONCE
Refills: 0 | Status: COMPLETED | OUTPATIENT
Start: 2020-01-26 | End: 2020-01-26

## 2020-01-26 RX ORDER — PHENAZOPYRIDINE HCL 100 MG
200 TABLET ORAL ONCE
Refills: 0 | Status: COMPLETED | OUTPATIENT
Start: 2020-01-26 | End: 2020-01-26

## 2020-01-26 RX ORDER — CEFTRIAXONE 500 MG/1
1000 INJECTION, POWDER, FOR SOLUTION INTRAMUSCULAR; INTRAVENOUS ONCE
Refills: 0 | Status: COMPLETED | OUTPATIENT
Start: 2020-01-26 | End: 2020-01-26

## 2020-01-26 RX ORDER — CEFPODOXIME PROXETIL 100 MG
1 TABLET ORAL
Qty: 20 | Refills: 0
Start: 2020-01-26 | End: 2020-02-04

## 2020-01-26 RX ADMIN — CEFTRIAXONE 1000 MILLIGRAM(S): 500 INJECTION, POWDER, FOR SOLUTION INTRAMUSCULAR; INTRAVENOUS at 10:43

## 2020-01-26 RX ADMIN — SODIUM CHLORIDE 1000 MILLILITER(S): 9 INJECTION INTRAMUSCULAR; INTRAVENOUS; SUBCUTANEOUS at 11:14

## 2020-01-26 RX ADMIN — SODIUM CHLORIDE 1000 MILLILITER(S): 9 INJECTION INTRAMUSCULAR; INTRAVENOUS; SUBCUTANEOUS at 10:14

## 2020-01-26 RX ADMIN — CEFTRIAXONE 100 MILLIGRAM(S): 500 INJECTION, POWDER, FOR SOLUTION INTRAMUSCULAR; INTRAVENOUS at 10:13

## 2020-01-26 RX ADMIN — Medication 200 MILLIGRAM(S): at 10:57

## 2020-01-26 NOTE — ED PROVIDER NOTE - NSFOLLOWUPINSTRUCTIONS_ED_ALL_ED_FT
Rest, drink plenty of fluids  Advance activity as tolerated  Continue all previously prescribed medications as directed  Follow up with your PMD 2-3 days- bring copies of your results  Return to the ER for worsening  follow up urology because of recurrent uti

## 2020-01-26 NOTE — ED PROVIDER NOTE - PATIENT PORTAL LINK FT
You can access the FollowMyHealth Patient Portal offered by [facility] by registering at the following website: [email or web address]. By joining [facility]’s FollowMyHealth portal, you will also be able to view your health information using other applications (apps) compatible with our system.

## 2020-01-26 NOTE — ED ADULT NURSE NOTE - OBJECTIVE STATEMENT
Pt presents to ER with chief complaint of "UTI" with burning and frequency. Pt states she was diagnosed with UTI 1/3/20 by NP at PMD office and prescribed abx cipro 500mg BID which pt did not complete, pt took medication "for a few days then I just forgot to take it". Pt has had frequent UTI's in the past. Pt is afebrile, vs stable, will continue to monitor pt safety maintained.

## 2020-01-26 NOTE — ED PROVIDER NOTE - CHPI ED SYMPTOMS NEG
no chills/no blood in stool/no vomiting/no abdominal distension/no fever/no diarrhea/no nausea/no hematuria

## 2020-01-26 NOTE — ED PROVIDER NOTE - CARE PROVIDER_API CALL
Douglas Washington)  Urology  [address], [address]  [place], [place] [id number]  Phone: [phone]  Fax: [phone]  Follow Up Time:

## 2020-01-27 LAB
CULTURE RESULTS: SIGNIFICANT CHANGE UP
SPECIMEN SOURCE: SIGNIFICANT CHANGE UP

## 2020-02-01 DIAGNOSIS — R30.0 DYSURIA: ICD-10-CM

## 2020-02-16 ENCOUNTER — EMERGENCY (EMERGENCY)
Facility: HOSPITAL | Age: 75
LOS: 1 days | Discharge: HOME | End: 2020-02-16
Attending: EMERGENCY MEDICINE | Admitting: EMERGENCY MEDICINE
Payer: MEDICARE

## 2020-02-16 VITALS
WEIGHT: 190.04 LBS | OXYGEN SATURATION: 98 % | TEMPERATURE: 98 F | HEART RATE: 91 BPM | RESPIRATION RATE: 16 BRPM | HEIGHT: 65 IN | SYSTOLIC BLOOD PRESSURE: 170 MMHG | DIASTOLIC BLOOD PRESSURE: 85 MMHG

## 2020-02-16 VITALS
DIASTOLIC BLOOD PRESSURE: 81 MMHG | RESPIRATION RATE: 20 BRPM | HEART RATE: 82 BPM | TEMPERATURE: 98 F | SYSTOLIC BLOOD PRESSURE: 175 MMHG | OXYGEN SATURATION: 98 %

## 2020-02-16 DIAGNOSIS — Z90.710 ACQUIRED ABSENCE OF BOTH CERVIX AND UTERUS: Chronic | ICD-10-CM

## 2020-02-16 DIAGNOSIS — Z98.89 OTHER SPECIFIED POSTPROCEDURAL STATES: Chronic | ICD-10-CM

## 2020-02-16 LAB
ALBUMIN SERPL ELPH-MCNC: 3.5 G/DL — SIGNIFICANT CHANGE UP (ref 3.3–5)
ALP SERPL-CCNC: 62 U/L — SIGNIFICANT CHANGE UP (ref 30–120)
ALT FLD-CCNC: 50 U/L DA — SIGNIFICANT CHANGE UP (ref 10–60)
ANION GAP SERPL CALC-SCNC: 11 MMOL/L — SIGNIFICANT CHANGE UP (ref 5–17)
APPEARANCE UR: CLEAR — SIGNIFICANT CHANGE UP
AST SERPL-CCNC: 17 U/L — SIGNIFICANT CHANGE UP (ref 10–40)
BACTERIA # UR AUTO: ABNORMAL
BASOPHILS # BLD AUTO: 0.03 K/UL — SIGNIFICANT CHANGE UP (ref 0–0.2)
BASOPHILS NFR BLD AUTO: 0.3 % — SIGNIFICANT CHANGE UP (ref 0–2)
BILIRUB SERPL-MCNC: 0.3 MG/DL — SIGNIFICANT CHANGE UP (ref 0.2–1.2)
BILIRUB UR-MCNC: NEGATIVE — SIGNIFICANT CHANGE UP
BUN SERPL-MCNC: 24 MG/DL — HIGH (ref 7–23)
CALCIUM SERPL-MCNC: 9.1 MG/DL — SIGNIFICANT CHANGE UP (ref 8.4–10.5)
CHLORIDE SERPL-SCNC: 106 MMOL/L — SIGNIFICANT CHANGE UP (ref 96–108)
CO2 SERPL-SCNC: 25 MMOL/L — SIGNIFICANT CHANGE UP (ref 22–31)
COLOR SPEC: YELLOW — SIGNIFICANT CHANGE UP
CREAT SERPL-MCNC: 0.75 MG/DL — SIGNIFICANT CHANGE UP (ref 0.5–1.3)
DIFF PNL FLD: ABNORMAL
EOSINOPHIL # BLD AUTO: 0.12 K/UL — SIGNIFICANT CHANGE UP (ref 0–0.5)
EOSINOPHIL NFR BLD AUTO: 1.3 % — SIGNIFICANT CHANGE UP (ref 0–6)
EPI CELLS # UR: SIGNIFICANT CHANGE UP
GLUCOSE SERPL-MCNC: 258 MG/DL — HIGH (ref 70–99)
GLUCOSE UR QL: 1000 MG/DL
HCT VFR BLD CALC: 40.6 % — SIGNIFICANT CHANGE UP (ref 34.5–45)
HGB BLD-MCNC: 12.9 G/DL — SIGNIFICANT CHANGE UP (ref 11.5–15.5)
IMM GRANULOCYTES NFR BLD AUTO: 0.2 % — SIGNIFICANT CHANGE UP (ref 0–1.5)
KETONES UR-MCNC: NEGATIVE — SIGNIFICANT CHANGE UP
LEUKOCYTE ESTERASE UR-ACNC: ABNORMAL
LYMPHOCYTES # BLD AUTO: 1.75 K/UL — SIGNIFICANT CHANGE UP (ref 1–3.3)
LYMPHOCYTES # BLD AUTO: 18.7 % — SIGNIFICANT CHANGE UP (ref 13–44)
MCHC RBC-ENTMCNC: 29.1 PG — SIGNIFICANT CHANGE UP (ref 27–34)
MCHC RBC-ENTMCNC: 31.8 GM/DL — LOW (ref 32–36)
MCV RBC AUTO: 91.6 FL — SIGNIFICANT CHANGE UP (ref 80–100)
MONOCYTES # BLD AUTO: 0.82 K/UL — SIGNIFICANT CHANGE UP (ref 0–0.9)
MONOCYTES NFR BLD AUTO: 8.8 % — SIGNIFICANT CHANGE UP (ref 2–14)
NEUTROPHILS # BLD AUTO: 6.61 K/UL — SIGNIFICANT CHANGE UP (ref 1.8–7.4)
NEUTROPHILS NFR BLD AUTO: 70.7 % — SIGNIFICANT CHANGE UP (ref 43–77)
NITRITE UR-MCNC: NEGATIVE — SIGNIFICANT CHANGE UP
NRBC # BLD: 0 /100 WBCS — SIGNIFICANT CHANGE UP (ref 0–0)
PH UR: 6.5 — SIGNIFICANT CHANGE UP (ref 5–8)
PLATELET # BLD AUTO: 206 K/UL — SIGNIFICANT CHANGE UP (ref 150–400)
POTASSIUM SERPL-MCNC: 4.1 MMOL/L — SIGNIFICANT CHANGE UP (ref 3.5–5.3)
POTASSIUM SERPL-SCNC: 4.1 MMOL/L — SIGNIFICANT CHANGE UP (ref 3.5–5.3)
PROT SERPL-MCNC: 6.9 G/DL — SIGNIFICANT CHANGE UP (ref 6–8.3)
PROT UR-MCNC: 100 MG/DL
RBC # BLD: 4.43 M/UL — SIGNIFICANT CHANGE UP (ref 3.8–5.2)
RBC # FLD: 13 % — SIGNIFICANT CHANGE UP (ref 10.3–14.5)
RBC CASTS # UR COMP ASSIST: ABNORMAL /HPF (ref 0–4)
SODIUM SERPL-SCNC: 142 MMOL/L — SIGNIFICANT CHANGE UP (ref 135–145)
SP GR SPEC: 1.01 — SIGNIFICANT CHANGE UP (ref 1.01–1.02)
UROBILINOGEN FLD QL: NEGATIVE MG/DL — SIGNIFICANT CHANGE UP
WBC # BLD: 9.35 K/UL — SIGNIFICANT CHANGE UP (ref 3.8–10.5)
WBC # FLD AUTO: 9.35 K/UL — SIGNIFICANT CHANGE UP (ref 3.8–10.5)
WBC UR QL: ABNORMAL

## 2020-02-16 PROCEDURE — 85027 COMPLETE CBC AUTOMATED: CPT

## 2020-02-16 PROCEDURE — 36415 COLL VENOUS BLD VENIPUNCTURE: CPT

## 2020-02-16 PROCEDURE — 81001 URINALYSIS AUTO W/SCOPE: CPT

## 2020-02-16 PROCEDURE — 99283 EMERGENCY DEPT VISIT LOW MDM: CPT

## 2020-02-16 PROCEDURE — 87086 URINE CULTURE/COLONY COUNT: CPT

## 2020-02-16 PROCEDURE — 80053 COMPREHEN METABOLIC PANEL: CPT

## 2020-02-16 RX ORDER — NITROFURANTOIN MACROCRYSTAL 50 MG
1 CAPSULE ORAL
Qty: 20 | Refills: 0
Start: 2020-02-16 | End: 2020-02-25

## 2020-02-16 RX ORDER — NITROFURANTOIN MACROCRYSTAL 50 MG
100 CAPSULE ORAL ONCE
Refills: 0 | Status: COMPLETED | OUTPATIENT
Start: 2020-02-16 | End: 2020-02-16

## 2020-02-16 RX ADMIN — Medication 100 MILLIGRAM(S): at 19:50

## 2020-02-16 NOTE — ED PROVIDER NOTE - CARE PROVIDER_API CALL
Mina Payan)  Urology  [address]  [place], [place] [id number]  Phone: [phone]  Fax: [phone]  Follow Up Time:     Gamaliel Varma)  Urology  [address], [address]  [place] [address]  Phone: [phone]  Fax: [phone]  Follow Up Time:

## 2020-02-16 NOTE — ED PROVIDER NOTE - ATTENDING CONTRIBUTION TO CARE
Jacob Rogers MD: I have personally performed a face to face diagnostic evaluation on this patient.  I have reviewed the PA note and agree with the history, exam, and plan of care, except as noted.  History and Exam by me shows same findings as documented  Attending Note: Patient with recurrent pain on urination after recent treatment for UTI. Some mild lower abdo tenderness. No bladder distention. No CVAT. Agree with plan

## 2020-02-16 NOTE — ED ADULT NURSE NOTE - OBJECTIVE STATEMENT
Several UTI over the last couple of months. Was on two courses of ABX but they return. Last ABX use 1 week ago.

## 2020-02-16 NOTE — ED PROVIDER NOTE - NONTENDER LOCATION
left upper quadrant/umbilical/right upper quadrant/right lower quadrant/right costovertebral angle/left lower quadrant/periumbilical/suprapubic/left costovertebral angle

## 2020-02-16 NOTE — ED PROVIDER NOTE - CLINICAL SUMMARY MEDICAL DECISION MAKING FREE TEXT BOX
pt with uti. labs reviewed. will tx with Macrobid and advise prompt urologist follow up. All labs reviewed. all results reviewed with pt including abnormal results. pt given a copy of results. pt advised to follow up with pmd regarding abnormal results. All questions answered and concerns addressed. pt verbalized understanding and agreement with plan and dx. pt advised on next step and when/where to follow up. pt advised on all take home and otc medications. pt advised to follow up with PMD. pt advised to return to ed for worsenng symptoms including fever, cp, sob. will dc.

## 2020-02-16 NOTE — ED ADULT TRIAGE NOTE - CHIEF COMPLAINT QUOTE
" I have extreme pain when I urinate - I have urinary urgency - its hard to stop when I urinate " No fever

## 2020-02-16 NOTE — ED PROVIDER NOTE - NSFOLLOWUPINSTRUCTIONS_ED_ALL_ED_FT
Urinary Tract Infection    A urinary tract infection (UTI) is an infection of any part of the urinary tract, which includes the kidneys, ureters, bladder, and urethra. Risk factors include ignoring your need to urinate, wiping back to front if female, being an uncircumcised male, and having diabetes or a weak immune system. Symptoms include frequent urination, pain or burning with urination, foul smelling urine, cloudy urine, pain in the lower abdomen, blood in the urine, and fever. If you were prescribed an antibiotic medicine, take it as told by your health care provider. Do not stop taking the antibiotic even if you start to feel better.    SEEK IMMEDIATE MEDICAL CARE IF YOU HAVE ANY OF THE FOLLOWING SYMPTOMS: severe back or abdominal pain, fever, inability to keep fluids or medicine down, dizziness/lightheadedness, or a change in mental status.  1. FOLLOW UP WITH YOUR PRIMARY DOCTOR IN 24-48 HOURS.   2. FOLLOW UP WITH ALL SPECIALIST DISCUSSED DURING YOUR VISIT.   3. TAKE ALL MEDICATIONS PRESCRIBED IN THE ER IF ANY ARE PRESCRIBED. CONTINUE YOUR HOME MEDICATIONS UNLESS OTHERWISE ADVISED DIFFERENTLY.   4. RETURN FOR WORSENING SYMPTOMS OR CONCERNS INCLUDING BUT NOT LIMITED TO FEVER, CHEST PAIN, OR TROUBLE BREATHING OR ANY OTHER CONCERNS  5 take macrobid daily as prescribed.

## 2020-02-16 NOTE — ED ADULT NURSE NOTE - NSIMPLEMENTINTERV_GEN_ALL_ED
Implemented All Universal Safety Interventions:  [place] to call system. Call bell, personal items and telephone within reach. Instruct patient to call for assistance. Room bathroom lighting operational. Non-slip footwear when patient is off stretcher. Physically safe environment: no spills, clutter or unnecessary equipment. Stretcher in lowest position, wheels locked, appropriate side rails in place.

## 2020-02-16 NOTE — ED PROVIDER NOTE - PATIENT PORTAL LINK FT
You can access the FollowMyHealth Patient Portal offered by [facility] by registering at the following website: [email or web address]. By joining [employer]’s FollowMyHealth portal, you will also be able to view your health information using other applications (apps) compatible with our system.

## 2020-02-16 NOTE — ED ADULT NURSE NOTE - PMH
Acute UTI    Basal cell cancer  skin  Diabetes mellitus    Elevated cholesterol    Hypertension    OA (osteoarthritis)    Scoliosis

## 2020-02-16 NOTE — ED PROVIDER NOTE - OBJECTIVE STATEMENT
pt is a 75yo male with pmhx of dm, hld and uti presents with dysuria x days. pt reports urinary incontinence with dysuria. pt recetnly treated for uti 1/26/2020 with cefpodoxime. pt was seen at [facility] at this time, had labs showing uti. pt advised symptoms improved at this time. pt reports friday she started to have symptoms again. pt did not follow up with urologist. pt denies fever, n/v/d, hematuria, saddle anesthesia, back pain.

## 2020-02-18 LAB
CULTURE RESULTS: SIGNIFICANT CHANGE UP
SPECIMEN SOURCE: SIGNIFICANT CHANGE UP

## 2020-06-10 ENCOUNTER — EMERGENCY (EMERGENCY)
Facility: HOSPITAL | Age: 75
LOS: 1 days | Discharge: HOME | End: 2020-06-10
Attending: EMERGENCY MEDICINE | Admitting: EMERGENCY MEDICINE
Payer: MEDICARE

## 2020-06-10 VITALS
RESPIRATION RATE: 16 BRPM | TEMPERATURE: 98 F | SYSTOLIC BLOOD PRESSURE: 168 MMHG | HEART RATE: 88 BPM | DIASTOLIC BLOOD PRESSURE: 96 MMHG | OXYGEN SATURATION: 96 %

## 2020-06-10 VITALS
HEART RATE: 97 BPM | TEMPERATURE: 99 F | DIASTOLIC BLOOD PRESSURE: 86 MMHG | OXYGEN SATURATION: 98 % | HEIGHT: 65 IN | SYSTOLIC BLOOD PRESSURE: 148 MMHG | RESPIRATION RATE: 17 BRPM | WEIGHT: 199.96 LBS

## 2020-06-10 DIAGNOSIS — Z98.89 OTHER SPECIFIED POSTPROCEDURAL STATES: Chronic | ICD-10-CM

## 2020-06-10 DIAGNOSIS — Z90.710 ACQUIRED ABSENCE OF BOTH CERVIX AND UTERUS: Chronic | ICD-10-CM

## 2020-06-10 PROBLEM — N39.0 URINARY TRACT INFECTION, SITE NOT SPECIFIED: Chronic | Status: ACTIVE | Noted: 2020-02-16

## 2020-06-10 LAB
APPEARANCE UR: ABNORMAL
BACTERIA # UR AUTO: ABNORMAL
BILIRUB UR-MCNC: NEGATIVE — SIGNIFICANT CHANGE UP
COLOR SPEC: YELLOW — SIGNIFICANT CHANGE UP
DIFF PNL FLD: ABNORMAL
EPI CELLS # UR: SIGNIFICANT CHANGE UP
GLUCOSE UR QL: 1000 MG/DL
KETONES UR-MCNC: NEGATIVE — SIGNIFICANT CHANGE UP
LEUKOCYTE ESTERASE UR-ACNC: ABNORMAL
NITRITE UR-MCNC: NEGATIVE — SIGNIFICANT CHANGE UP
PH UR: 5 — SIGNIFICANT CHANGE UP (ref 5–8)
PROT UR-MCNC: 15 MG/DL
RBC CASTS # UR COMP ASSIST: ABNORMAL /HPF (ref 0–4)
SP GR SPEC: 1.01 — SIGNIFICANT CHANGE UP (ref 1.01–1.02)
UROBILINOGEN FLD QL: NEGATIVE MG/DL — SIGNIFICANT CHANGE UP
WBC UR QL: ABNORMAL

## 2020-06-10 PROCEDURE — 81001 URINALYSIS AUTO W/SCOPE: CPT

## 2020-06-10 PROCEDURE — 87086 URINE CULTURE/COLONY COUNT: CPT

## 2020-06-10 PROCEDURE — 99283 EMERGENCY DEPT VISIT LOW MDM: CPT

## 2020-06-10 RX ORDER — CARISOPRODOL 250 MG
0 TABLET ORAL
Qty: 0 | Refills: 0 | DISCHARGE

## 2020-06-10 RX ORDER — CEFUROXIME AXETIL 250 MG
1 TABLET ORAL
Qty: 14 | Refills: 0
Start: 2020-06-10 | End: 2020-06-16

## 2020-06-10 RX ORDER — ZOLPIDEM TARTRATE 10 MG/1
1 TABLET ORAL
Qty: 0 | Refills: 0 | DISCHARGE

## 2020-06-10 RX ORDER — PHENAZOPYRIDINE HCL 100 MG
1 TABLET ORAL
Qty: 6 | Refills: 0
Start: 2020-06-10 | End: 2020-06-11

## 2020-06-10 RX ORDER — PHENAZOPYRIDINE HCL 100 MG
100 TABLET ORAL ONCE
Refills: 0 | Status: COMPLETED | OUTPATIENT
Start: 2020-06-10 | End: 2020-06-10

## 2020-06-10 RX ORDER — CEFUROXIME AXETIL 250 MG
500 TABLET ORAL ONCE
Refills: 0 | Status: COMPLETED | OUTPATIENT
Start: 2020-06-10 | End: 2020-06-10

## 2020-06-10 RX ADMIN — Medication 500 MILLIGRAM(S): at 11:18

## 2020-06-10 RX ADMIN — Medication 100 MILLIGRAM(S): at 11:18

## 2020-06-10 NOTE — ED PROVIDER NOTE - PATIENT PORTAL LINK FT
You can access the FollowMyHealth Patient Portal offered by [facility] by registering at the following website: [email or web address]. By joining [employer]’s FollowMyHealth portal, you will also be able to view your health information using other applications (apps) compatible with our system.

## 2020-06-10 NOTE — ED PROVIDER NOTE - OBJECTIVE STATEMENT
pt c/o dysuria, frequency x 2 days c/w prior uti's. no fevers, chills, d/n/v, abd or back pain, hematuria or any other complaints. pt was unable to get in touch with pmd, so came to er.  pmd - lauren lubin

## 2020-06-10 NOTE — ED PROVIDER NOTE - CARE PROVIDER_API CALL
Eduin Person  CARDIOVASCULAR DISEASE  [address]  [place] [address]  Phone: [phone]  Fax: [phone]  Follow Up Time: 1-3 Days

## 2020-06-10 NOTE — ED ADULT TRIAGE NOTE - CHIEF COMPLAINT QUOTE
Patient presents from home with urinary symptoms for 2 days. attempted to call PMD but when symptoms worsened came to ER.

## 2020-06-11 LAB
CULTURE RESULTS: SIGNIFICANT CHANGE UP
SPECIMEN SOURCE: SIGNIFICANT CHANGE UP

## 2020-07-27 NOTE — PROGRESS NOTE ADULT - PROBLEM SELECTOR PLAN 1
Concerns with MSSA bacteremia with no clear source. Continue IV abx, MRI not identifying source. Critical to get TTE and continue to follow blood cultures. Yersterday's negative at 24 hours but will follow and have ordered for tomorrow am. 27-Jul-2020

## 2020-12-21 PROBLEM — Z12.11 ENCOUNTER FOR SCREENING COLONOSCOPY: Status: RESOLVED | Noted: 2019-04-15 | Resolved: 2020-12-21

## 2020-12-22 ENCOUNTER — APPOINTMENT (OUTPATIENT)
Dept: MAMMOGRAPHY | Facility: HOSPITAL | Age: 75
End: 2020-12-22
Payer: MEDICARE

## 2020-12-22 ENCOUNTER — OUTPATIENT (OUTPATIENT)
Dept: OUTPATIENT SERVICES | Facility: HOSPITAL | Age: 75
LOS: 1 days | End: 2020-12-22
Payer: MEDICARE

## 2020-12-22 DIAGNOSIS — Z98.89 OTHER SPECIFIED POSTPROCEDURAL STATES: Chronic | ICD-10-CM

## 2020-12-22 DIAGNOSIS — Z00.8 ENCOUNTER FOR OTHER GENERAL EXAMINATION: ICD-10-CM

## 2020-12-22 DIAGNOSIS — Z90.710 ACQUIRED ABSENCE OF BOTH CERVIX AND UTERUS: Chronic | ICD-10-CM

## 2020-12-22 PROCEDURE — 77067 SCR MAMMO BI INCL CAD: CPT

## 2020-12-22 PROCEDURE — 77067 SCR MAMMO BI INCL CAD: CPT | Mod: 26

## 2020-12-22 PROCEDURE — 77063 BREAST TOMOSYNTHESIS BI: CPT | Mod: 26

## 2020-12-22 PROCEDURE — 77063 BREAST TOMOSYNTHESIS BI: CPT

## 2021-01-14 NOTE — ED PROVIDER NOTE - MEDICAL DECISION MAKING DETAILS
SW called patient's parents per provider request to assist with referral for resources to dentist per provider's request.    Left message on voicemail requesting a call back.   Will get xray of painful areas, and do head ct to r/o significant injury

## 2021-02-16 NOTE — ED ADULT NURSE NOTE - RELIEVING FACTORS
Social Work Discharge/Planning:     SW met with patient to explain role and discuss transition of care. Patient reports being independent and able to drive prior to admission. Patient is from home. Patient lives with wife Efe Simmons [phone]) in a 2 story home that has 2 steps to enter. Bed and bath are located on the 2nd floor. Patient is independent with all ADL's. Patient has no DME. Patient does not wear oxygen at home. Patient denies [facility]/SNF/HHC hx. Patient's PCP is Dr. Jorge Cardona. Patient's pharmacy is [facility]. Patient reports plan is to return home with no anticipated needs once medically clear for discharge. Patient reports his wife is able to transport him home upon discharge. CARLOS/OFELIA to follow for any needs that may arise upon discharge.     PRINCE Otto  [phone] none

## 2021-02-25 NOTE — ED ADULT NURSE NOTE - THROAT
Pt states \"my meds are not right\", states her remeron was increased one week ago. Pt states is not helping with her anxiety and depression. States \"there's nothing wrong with my life\" when stating her anxiety is causing her to be depressed.   Pt denie
asymptomatic

## 2021-03-04 ENCOUNTER — APPOINTMENT (OUTPATIENT)
Dept: INTERNAL MEDICINE | Facility: CLINIC | Age: 76
End: 2021-03-04
Payer: MEDICARE

## 2021-03-04 ENCOUNTER — NON-APPOINTMENT (OUTPATIENT)
Age: 76
End: 2021-03-04

## 2021-03-04 VITALS
SYSTOLIC BLOOD PRESSURE: 138 MMHG | DIASTOLIC BLOOD PRESSURE: 84 MMHG | RESPIRATION RATE: 14 BRPM | HEIGHT: 65 IN | OXYGEN SATURATION: 98 % | HEART RATE: 86 BPM | TEMPERATURE: 97.8 F | WEIGHT: 200 LBS | BODY MASS INDEX: 33.32 KG/M2

## 2021-03-04 DIAGNOSIS — Z00.00 ENCOUNTER FOR GENERAL ADULT MEDICAL EXAMINATION W/OUT ABNORMAL FINDINGS: ICD-10-CM

## 2021-03-04 DIAGNOSIS — Z84.89 FAMILY HISTORY OF OTHER SPECIFIED CONDITIONS: ICD-10-CM

## 2021-03-04 DIAGNOSIS — Z80.1 FAMILY HISTORY OF MALIGNANT NEOPLASM OF TRACHEA, BRONCHUS AND LUNG: ICD-10-CM

## 2021-03-04 PROCEDURE — G0438: CPT

## 2021-03-04 RX ORDER — METOPROLOL SUCCINATE 50 MG/1
50 TABLET, EXTENDED RELEASE ORAL
Refills: 0 | Status: ACTIVE | COMMUNITY

## 2021-03-04 RX ORDER — POLYETHYLENE GLYCOL 3350, SODIUM SULFATE, SODIUM CHLORIDE, POTASSIUM CHLORIDE, ASCORBIC ACID, SODIUM ASCORBATE 7.5-2.691G
100 KIT ORAL
Qty: 1 | Refills: 0 | Status: DISCONTINUED | COMMUNITY
Start: 2019-04-15 | End: 2021-03-04

## 2021-03-04 RX ORDER — ROSUVASTATIN CALCIUM 20 MG/1
20 TABLET, FILM COATED ORAL
Refills: 0 | Status: ACTIVE | COMMUNITY

## 2021-03-04 RX ORDER — INSULIN GLARGINE 100 [IU]/ML
100 INJECTION, SOLUTION SUBCUTANEOUS TWICE DAILY
Refills: 0 | Status: ACTIVE | COMMUNITY

## 2021-03-04 RX ORDER — GLIMEPIRIDE 4 MG/1
4 TABLET ORAL TWICE DAILY
Refills: 0 | Status: ACTIVE | COMMUNITY

## 2021-03-04 RX ORDER — FOSINOPRIL SODIUM 20 MG/1
20 TABLET ORAL
Refills: 0 | Status: ACTIVE | COMMUNITY

## 2021-03-04 NOTE — HISTORY OF PRESENT ILLNESS
[de-identified] : 75 y.o. F with PMHx of T2DM, HLD, HTN presents as new pt to establish care. Pt currently being treated for uti and shingles. \par \par \par \par \par \par

## 2021-03-04 NOTE — PLAN
[FreeTextEntry1] : HCM:\par -pt had labs recently, asked for labs to be faxed over \par -UTD with mammo\par -DEXA script provided \par -UTD with colonoscopy \par \par T2DM: followed by endo-dr. tavarez, pt UTD with diabetic eye exam, on lantus and glimepiride, pt on ARB and statin\par CAD: followed by cardio-Dr. Person, on baby ASA, toprol, statin \par \par \par

## 2021-03-04 NOTE — HEALTH RISK ASSESSMENT
[Patient reported mammogram was normal] : Patient reported mammogram was normal [Patient reported colonoscopy was normal] : Patient reported colonoscopy was normal [MammogramDate] : 12/20 [ColonoscopyDate] : 01/17

## 2021-08-05 ENCOUNTER — EMERGENCY (EMERGENCY)
Facility: HOSPITAL | Age: 76
LOS: 1 days | Discharge: HOME | End: 2021-08-05
Attending: EMERGENCY MEDICINE | Admitting: EMERGENCY MEDICINE
Payer: MEDICARE

## 2021-08-05 VITALS
DIASTOLIC BLOOD PRESSURE: 88 MMHG | WEIGHT: 195.11 LBS | OXYGEN SATURATION: 95 % | TEMPERATURE: 98 F | HEIGHT: 65 IN | RESPIRATION RATE: 18 BRPM | HEART RATE: 83 BPM | SYSTOLIC BLOOD PRESSURE: 160 MMHG

## 2021-08-05 DIAGNOSIS — Z98.89 OTHER SPECIFIED POSTPROCEDURAL STATES: Chronic | ICD-10-CM

## 2021-08-05 DIAGNOSIS — Z90.710 ACQUIRED ABSENCE OF BOTH CERVIX AND UTERUS: Chronic | ICD-10-CM

## 2021-08-05 PROCEDURE — 99282 EMERGENCY DEPT VISIT SF MDM: CPT

## 2021-08-05 PROCEDURE — 99283 EMERGENCY DEPT VISIT LOW MDM: CPT

## 2021-08-05 RX ORDER — ALENDRONATE SODIUM 70 MG/1
0 TABLET ORAL
Qty: 0 | Refills: 0 | DISCHARGE

## 2021-08-05 RX ORDER — GLIMEPIRIDE 1 MG
1 TABLET ORAL
Qty: 0 | Refills: 0 | DISCHARGE

## 2021-08-05 RX ORDER — INSULIN GLARGINE 100 [IU]/ML
0 INJECTION, SOLUTION SUBCUTANEOUS
Qty: 0 | Refills: 0 | DISCHARGE

## 2021-08-05 NOTE — ED PROVIDER NOTE - CARE PROVIDER_API CALL
Philip Jones)  Surgery  [address], [address]  [place], [place] [id number]  Phone: [phone]  Fax: [phone]  Follow Up Time:

## 2021-08-05 NOTE — ED ADULT NURSE NOTE - OBJECTIVE STATEMENT
states she got out of shower and noticed her lower leg was bleeding "shooting across the room"  bleeding has resolved.  patient noted to have superficial varicosities of legs, denies injury and denies pain

## 2021-08-05 NOTE — ED PROVIDER NOTE - PATIENT PORTAL LINK FT
You can access the FollowMyHealth Patient Portal offered by [facility] by registering at the following website: [email or web address]. By joining [employer]’s FollowMyHealth portal, you will also be able to view your health information using other applications (apps) compatible with our system.

## 2021-08-05 NOTE — ED PROVIDER NOTE - OBJECTIVE STATEMENT
76F presents to the ED due to bleeding from the left leg. Patient states that she was in the shower and she doesn't know why but she noted that she was squirting blood from her left leg. She does have superficial varicose and spider veins. Takes ASA. No other complaints. She placed a dressing over it and came to the ED.

## 2021-08-05 NOTE — ED PROVIDER NOTE - CLINICAL SUMMARY MEDICAL DECISION MAKING FREE TEXT BOX
76F presents to the ED due to bleeding from the left leg. Patient states that she was in the shower and she doesn't know why but she noted that she was squirting blood from her left leg. She does have superficial varicose and spider veins. Takes ASA. No other complaints. She placed a dressing over it and came to the ED.  Exam as stated. Dressing applied to the leg. Well appearing. Stable for d/c. Pt will f/u with Dr Jones, whom she has seen in the past.

## 2021-08-05 NOTE — ED ADULT NURSE NOTE - NS ED NURSE LEVEL OF CONSCIOUSNESS ORIENTATION
INPATIENT PROGRESS NOTES    PATIENT NAME: Merlene Puckett  MRN: [id number]  SERVICE DATE:  July 1, 2021   SERVICE TIME:  6:45 PM      PRIMARY SERVICE: Pulmonary Disease    CHIEF COMPLAIN: COVID-19 infection      INTERVAL HPI: Patient seen and examined at bedside, Interval Notes, orders reviewed. Nursing notes noted  Patient was seen earlier, late entry. She is feeling good and home going home today. She is on room air and O2 saturation 97%. Cough and shortness of breath improved. She said her weakness also improved. No fever or chills. OBJECTIVE    Body mass index is 31.09 kg/m². PHYSICAL EXAM:  Vitals:  BP (!) 108/53   Pulse 60   Temp 97.9 °F (36.6 °C) (Oral)   Resp 18   Ht 5' 2\" (1.575 m)   Wt 170 lb (77.1 kg)   SpO2 97%   BMI 31.09 kg/m²   General:Alert, awake . comfortable in bed, No distress. Head: Atraumatic , Normocephalic   Eyes: PERRL. No sclera icterus. No conjunctival injection. No discharge   ENT: No nasal  discharge. Pharynx clear. Neck:  Trachea midline. No thyromegaly, no JVD, No cervical adenopathy. Chest : Bilaterally symmetrical ,Normal effort,  No accessory muscle use  Lung : . Fair BS bilateral, decreased BS at bases. No Rales. No wheezing. No rhonchi. Heart[de-identified] Normal  rate. Regular rhythm. No mumur ,  Rub or gallop  ABD: Non-tender. Non-distended. No masses. No organmegaly. Normal bowel sounds. No hernia.   Ext : No Pitting both leg , No Cyanosis No clubbing  Neuro: no focal weakness          DATA:   Recent Labs     06/30/21  1024 07/01/21  0658   WBC 5.0 8.7   HGB 13.4 12.8   HCT 40.8 38.5   MCV 85.0 83.9    354     Recent Labs     06/30/21  1024 06/30/21  1024 07/01/21  0658     --  143   K 4.4  --  4.4     --  105   CO2 27  --  28   BUN 19  --  16   CREATININE 0.49*  --  0.53   GLUCOSE 172*  --  146*   CALCIUM 9.2  --  8.6   PROT 6.4  --  5.9*   LABALBU 3.5  --  3.2*   BILITOT <0.2  --  <0.2   ALKPHOS 71  --  60   AST 18  --  20   ALT 24   < > 21 LABGLOM >60.0   < > >60.0   GFRAA >60.0   < > >60.0   GLOB 2.9   < > 2.7    < > = values in this interval not displayed. MV Settings:          No results for input(s): PHART, BJL7STA, PO2ART, KIL1FYY, BEART, B1JYGZQT in the last 72 hours. O2 Device: None (Room air)  O2 Flow Rate (L/min): 2 L/min    No diet orders on file     MEDICATIONS during current hospitalization:    Continuous Infusions:      Scheduled Meds:      PRN Meds:    Radiology  CT Head WO Contrast    Result Date: 6/29/2021  EXAMINATION: CT of the brain without contrast HISTORY: Pain after a fall COMPARISON: None available TECHNIQUE: Multiple contiguous axial images were obtained of the brain from the skull base through the vertex. Multiplanar reformats were obtained. FINDINGS: Brain volume is age appropriate. Ventricular morphology is within normal limits. Gray-white matter differentiation is preserved. No acute hemorrhage or abnormal extra-axial fluid collection. No mass effect or midline shift. Mild paranasal sinus mucosal thickening. The mastoid air cells are clear. Calvarium is intact. No acute intracranial process. All CT scans at this facility use dose modulation, iterative reconstruction, and/or weight based dosing when appropriate to reduce radiation dose to as low as reasonably achievable. XR CHEST PORTABLE    Result Date: 6/29/2021  EXAMINATION: XR CHEST PORTABLE CLINICAL HISTORY: WEAKNESS, COUGH COMPARISONS: CHEST RADIOGRAPH APRIL 17, 2018 FINDINGS: Low lung volumes. Osseous structures intact. Cardiopericardial silhouette normal. Ill-defined area increased opacity left lung base. LEFT LOWER LUNG SUBSEGMENTAL ATELECTASIS/PNEUMONIA IN THIS PARTIALLY EXPIRATORY CHEST RADIOGRAPH. IMPRESSION AND SUGGESTION:  1. COVID-19 virus infection and possible pneumonia   2. Hypoxia, improved  3. Left lung base opacity pneumonia versus atelectasis  4. History of fall with closed head injury  5.  Generalized weakness  6. Hypertension  7. Hyperlipidemia    She is going home today. She will have Mucinex 600 mg twice daily. Eliquis 2.5 mg twice daily. Decadron 6 mg p.o. daily daily for 7 days. .  She is on room air O2 saturation 97%. NOTE: This report was transcribed using voice recognition software. Every effort was made to ensure accuracy; however, inadvertent computerized transcription errors may be present.       Electronically signed by Delaney Ann MD, FCCP on 7/1/2021 at 6:45 PM Oriented - self; Oriented - place; Oriented - time

## 2021-08-05 NOTE — ED PROVIDER NOTE - PHYSICAL EXAMINATION
No active bleeding. There is a small opening on the front lateral aspect of the left lower leg. No active bleeding at this time. Spider veins are notable. Old scabs to the remainder of legs (b/l) - few in amount (at least 3).

## 2021-08-05 NOTE — ED PROVIDER NOTE - NSFOLLOWUPINSTRUCTIONS_ED_ALL_ED_FT
Varicose Veins    WHAT YOU NEED TO KNOW:    What are varicose veins? Varicose veins are veins that become large, twisted, and swollen. They are common on the back of the calves, knees, and thighs. Varicose veins are caused by valves in your veins that do not work properly. This causes blood to collect and increase pressure in the veins of your legs. The increased pressure causes your veins to stretch, get larger, swell, and twist.    Varicose Veins         What increases my risk for varicose veins?   •Pregnancy      •A family history of varicose veins      •Being overweight or obese      •Age 50 years or older      •Sitting or standing for long periods of time      •Wearing tight clothing      What are the signs and symptoms of varicose veins? Your symptoms may be worse after you stand or sit for long periods of time. You may have any of the following:   •Blue, purple, or bulging veins in your legs       •Pain, swelling, or muscle cramps in your legs      •Feeling of fatigue or heaviness in your legs      How are varicose veins diagnosed? Your healthcare provider will examine your legs and ask about your medical history. You may need tests, such as a Doppler ultrasound or duplex scan. These tests show your veins and valves, and how your blood is flowing through them. These tests may also show if there is a blockage or blood clot.    How are varicose veins treated? The goal of treatment is to decrease symptoms, improve appearance, and prevent further problems. Treatment will depend on which veins are affected and how severe your condition is. You may need procedures to treat or remove your varicose veins. For example, your healthcare provider may inject a solution or use a laser to close the varicose veins. Surgery to remove long veins may also be done. Ask your healthcare provider for more information about procedures used to treat varicose veins.    What can I do to manage my symptoms?   •Do not sit or stand for long periods of time. This can cause the blood to collect in your legs and make your symptoms worse. Bend or rotate your ankles several times every hour. Walk around for a few minutes every hour to get blood moving in your legs.      •Do not cross your legs when you sit. This decreases blood flow to your feet and can make your symptoms worse.      •Do not wear tight clothing or shoes. Do not wear high-heeled shoes. Do not wear clothes that are tight around the waist or knees.      •Maintain a healthy weight. Being overweight or obese can make your varicose veins worse. Ask your healthcare provider how much you should weigh. Ask him or her to help you create a weight loss plan if you are overweight.       •Wear pressure stockings as directed. The stockings are tight and put pressure on your legs. They improve blood flow and help prevent clots.   Pressure Stockings            •Elevate your legs. Keep them above the level of your heart for 15 to 30 minutes several times a day. You can also prop the end of your bed up slightly to elevate your legs while you sleep. This will help blood to flow back to your heart.  Elevate Leg           •Get regular exercise. Talk to your healthcare provider about the best exercise plan for you. Exercise can improve blood flow to your legs and feet.   Family Walking for Exercise           When should I seek immediate care?   •You have a wound that does not heal or is infected.      •You have an injury that has broken your skin and caused your varicose veins to bleed.      •Your leg is swollen and hard.      •You notice that your legs or feet are turning blue or black.      •Your leg feels warm, tender, and painful. It may look swollen and red.      When should I contact my healthcare provider?   •You have pain in your leg that does not go away or gets worse.      •You notice sudden large bruising on your legs.       •You have a rash on your leg.       •Your symptoms keep you from doing your daily activities.      •You have questions or concerns about your condition or care.      CARE AGREEMENT:    You have the right to help plan your care. Learn about your health condition and how it may be treated. Discuss treatment options with your healthcare providers to decide what care you want to receive. You always have the right to refuse treatment.

## 2021-08-09 NOTE — CHART NOTE - NSCHARTNOTEFT_GEN_A_CORE
SW called patient to assist with and discuss follow up care.  Patient presented to ED on 8/5/21 with bleeding from leg.  Patient was instructed to follow up with vascular surgeon Dr Jones.  Patient did not answer.

## 2021-09-02 ENCOUNTER — APPOINTMENT (OUTPATIENT)
Dept: INTERNAL MEDICINE | Facility: CLINIC | Age: 76
End: 2021-09-02

## 2021-09-24 NOTE — ED PROVIDER NOTE - CPE EDP EYES NORM
normal...
Catracho Lott)  MaternalFetal Medicine; Obstetrics and Gynecology  [address] [place] [address]  Phone: [phone]  Fax: [phone]  Established Patient  Follow Up Time: Routine

## 2021-11-09 ENCOUNTER — EMERGENCY (EMERGENCY)
Facility: HOSPITAL | Age: 76
LOS: 1 days | Discharge: HOME | End: 2021-11-09
Attending: EMERGENCY MEDICINE | Admitting: EMERGENCY MEDICINE
Payer: MEDICARE

## 2021-11-09 VITALS
TEMPERATURE: 98 F | OXYGEN SATURATION: 95 % | DIASTOLIC BLOOD PRESSURE: 86 MMHG | RESPIRATION RATE: 18 BRPM | HEART RATE: 93 BPM | HEIGHT: 65 IN | WEIGHT: 195.11 LBS | SYSTOLIC BLOOD PRESSURE: 179 MMHG

## 2021-11-09 DIAGNOSIS — Z98.89 OTHER SPECIFIED POSTPROCEDURAL STATES: Chronic | ICD-10-CM

## 2021-11-09 DIAGNOSIS — Z90.710 ACQUIRED ABSENCE OF BOTH CERVIX AND UTERUS: Chronic | ICD-10-CM

## 2021-11-09 PROCEDURE — 99283 EMERGENCY DEPT VISIT LOW MDM: CPT

## 2021-11-09 PROCEDURE — 87070 CULTURE OTHR SPECIMN AEROBIC: CPT

## 2021-11-09 PROCEDURE — 87077 CULTURE AEROBIC IDENTIFY: CPT

## 2021-11-09 PROCEDURE — 87205 SMEAR GRAM STAIN: CPT

## 2021-11-09 PROCEDURE — 87186 SC STD MICRODIL/AGAR DIL: CPT

## 2021-11-09 RX ORDER — CEPHALEXIN 500 MG
500 CAPSULE ORAL ONCE
Refills: 0 | Status: COMPLETED | OUTPATIENT
Start: 2021-11-09 | End: 2021-11-09

## 2021-11-09 RX ORDER — LISINOPRIL 2.5 MG/1
0 TABLET ORAL
Qty: 0 | Refills: 0 | DISCHARGE

## 2021-11-09 RX ORDER — INSULIN GLARGINE 100 [IU]/ML
0 INJECTION, SOLUTION SUBCUTANEOUS
Qty: 0 | Refills: 0 | DISCHARGE

## 2021-11-09 RX ORDER — GLIMEPIRIDE 1 MG
0 TABLET ORAL
Qty: 0 | Refills: 0 | DISCHARGE

## 2021-11-09 RX ORDER — CEPHALEXIN 500 MG
1 CAPSULE ORAL
Qty: 30 | Refills: 0
Start: 2021-11-09 | End: 2021-11-15

## 2021-11-09 RX ORDER — ALENDRONATE SODIUM 70 MG/1
0 TABLET ORAL
Qty: 0 | Refills: 0 | DISCHARGE

## 2021-11-09 RX ADMIN — Medication 500 MILLIGRAM(S): at 12:56

## 2021-11-09 NOTE — ED ADULT TRIAGE NOTE - CHIEF COMPLAINT QUOTE
Patient presents to ED from home complaining of abscess on left thumb. Patient states she first noticed redness a few days ago and now there is a visible puss filled abscess. Patient denies fever, chills, n/v/d, dizziness. Patient presents to ED from home complaining of abscess on left thumb. Patient states she first noticed redness a few days ago and now there is a visible puss filled abscess. Patient denies fever, chills, n/v/d, dizziness. ISAR POSITIVE

## 2021-11-09 NOTE — ED PROVIDER NOTE - OBJECTIVE STATEMENT
pt 76y f hx DM presents to ED from home complaining of abscess on left thumb for 5 days. Patient states she first noticed redness a few days ago and now there is a visible puss filled abscess. Patient denies fever, chills, n/v/d, dizziness.

## 2021-11-09 NOTE — ED PROVIDER NOTE - CARE PROVIDER_API CALL
Yovani Sumner)  Plastic Surgery  [address] [place] [address], [address] [place] [address]  Phone: [phone]  Fax: [phone]  Follow Up Time:

## 2021-11-09 NOTE — CHART NOTE - NSCHARTNOTEFT_GEN_A_CORE
SW met with patient at bedside to assess for social work needs and to complete home assessment of safety.  Patient is a 76 year old female presenting to ED for finger pain.  Patient denied needing any assistance in the home or community.  Denied needing any community referrals.  Patient reports she would just like to go home.  Patient reports being independent, no recent falls.  SW spoke with patient regarding follow up care with endo. Patient reports seeing a previous endo Dr Dooley.  SW offered to assist with scheduling patient in agreement. SW called office who was on lunch.  DELANO informed patient that SW will call office back and then call patient.  Contact number confirmed.

## 2021-11-09 NOTE — ED PROVIDER NOTE - PATIENT PORTAL LINK FT
You can access the FollowMyHealth Patient Portal offered by [facility] by registering at the following website: [email or web address]. By joining [employer]’s FollowMyHealth portal, you will also be able to view your health information using other applications (apps) compatible with our system.

## 2021-11-09 NOTE — ED ADULT NURSE NOTE - NSICDXPASTSURGICALHX_GEN_ALL_CORE_FT
PAST SURGICAL HISTORY:  H/O arthroscopic knee surgery left meniscus-- 25 years ago    H/O: hysterectomy

## 2021-11-09 NOTE — ED PROVIDER NOTE - PHYSICAL EXAMINATION
Gen: Well appearing in NAD  Head: NC/AT  Neck: trachea midline  Resp:  No distress  Ext: left thumb: paronychia with surrounding erythema to medial side of nail bed. sensation intact. soft compartments   Neuro:  A&O appears non focal  Skin:  Warm and dry as visualized  Psych:  Normal affect and mood

## 2021-11-09 NOTE — ED PROVIDER NOTE - ATTENDING CONTRIBUTION TO CARE
Michelle with [facility] Jorg[facility]. pt 76y f hx DM presents to ED from home complaining of abscess on left thumb for 5 days. Patient states she first noticed redness a few days ago and now there is a visible pus filled abscess. Patient denies fever, chills, n/v/d, dizziness.   paronychia drained by plastics. social work gave pt endocrine fu  Discussed with patient need to return to ED if symptoms don't continue to improve or recur or develops any new or worsening symptoms that are of concern.    I performed a face to face bedside interview with patient regarding history of present illness, review of symptoms and past medical history. I completed an independent physical exam.  I have discussed the patient's plan of care with Physician Assistant (PA). I agree with note as stated above, having amended the EMR as needed to reflect my findings.   This includes History of Present Illness, HIV, Past Medical/Surgical/Family/Social History, Allergies and Home Medications, Review of Systems, Physical Exam, and any Progress Notes during the time I functioned as the attending physician for this patient.

## 2021-11-09 NOTE — ED ADULT NURSE NOTE - NSICDXPASTMEDICALHX_GEN_ALL_CORE_FT
PAST MEDICAL HISTORY:  Acute UTI     Basal cell cancer skin    Diabetes mellitus     Elevated cholesterol     Hypertension     OA (osteoarthritis)     Scoliosis

## 2021-11-09 NOTE — ED ADULT NURSE NOTE - OBJECTIVE STATEMENT
Pt a&ox3 ambulatory to ED c/o L thumb pain, redness and swelling x days. Per pt she does not recall injuring finger. No meds for discomfort. Denies fever/chills,  denies drainage or blood.

## 2021-11-09 NOTE — ED PROVIDER NOTE - NSFOLLOWUPINSTRUCTIONS_ED_ALL_ED_FT
Paronychia    WHAT YOU NEED TO KNOW:    Paronychia is an infection of your nail fold caused by bacteria or a fungus. The nail fold is the skin around your nail. Paronychia may happen suddenly and last for 6 weeks or longer. You may have paronychia on more than 1 finger or toe.    DISCHARGE INSTRUCTIONS:    Medicines:   •Td vaccine is a booster shot used to help prevent tetanus and diphtheria. The Td booster may be given to adolescents and adults every 10 years or for certain wounds and injuries.      •Antibiotics: This medicine will help fight or prevent an infection. It may be given as a pill, cream, or ointment.      •Steroids: This medicine will help decrease inflammation. It may be given as a pill, cream, or ointment.      •Antifungal medicine: This medicine helps kill fungus that may be causing your infection. It may be given as a cream or ointment.      •NSAIDs: These medicines decrease pain and swelling. NSAIDs are available without a doctor's order. Ask your healthcare provider which medicine is right for you. Ask how much to take and when to take it. Take as directed. NSAIDs can cause stomach bleeding and kidney problems if not taken correctly.      •Take your medicine as directed. Contact your healthcare provider if you think your medicine is not helping or if you have side effects. Tell him of her if you are allergic to any medicine. Keep a list of the medicines, vitamins, and herbs you take. Include the amounts, and when and why you take them. Bring the list or the pill bottles to follow-up visits. Carry your medicine list with you in case of an emergency.      Follow up with your doctor as directed: Write down your questions so you remember to ask them during your visits.     Self-care:   •Soak your nail: Soak your nail in a mixture of equal parts vinegar and water 3 or 4 times each day. This will help decrease inflammation.      •Apply a warm compress: Soak a washcloth in warm water and place it on your nail. This will help decrease inflammation.       •Elevate: Raise your nail above the level of your heart as often as you can. This will help decrease swelling and pain. Prop your nail on pillows or blankets to keep it elevated comfortably.       •Use lotion: Apply lotion after you wash your hands. This will prevent your skin from becoming too dry.       Prevent paronychia:   •Avoid chemicals and allergens that may harm your skin and nails. This includes soaps, laundry detergents, and nail products.      •Keep your nails clean and dry. Avoid soaking your nails in water. Use cotton-lined rubber gloves or wear 2 rubber gloves if you work with food or water. The gloves will help protect your nail folds.      •Keep your nails short. Do not bite your nails, pick at your hangnails, suck your fingers, or wear fake nails. Bring your own nail tools when you go to the nail salon.      Contact your healthcare provider if:   •Your nail becomes loose, deformed, or falls off.      •You have a large abscess on your nail.      •You have questions or concerns about your condition or care.      Return to the emergency department if:   •You have severe nail pain.      •The inflammation spreads to your hand or arm.

## 2021-11-09 NOTE — ED PROVIDER NOTE - CLINICAL SUMMARY MEDICAL DECISION MAKING FREE TEXT BOX
pt 76y f hx DM presents to ED from home complaining of abscess on left thumb for 5 days. Patient states she first noticed redness a few days ago and now there is a visible puss filled abscess. Patient denies fever, chills, n/v/d, dizziness.   paronychia drained by plastics. sw gave pt endocrine fu  Discussed with patient need to return to ED if symptoms don't continue to improve or recur or develops any new or worsening symptoms that are of concern.

## 2021-11-09 NOTE — ED ADULT NURSE NOTE - CHIEF COMPLAINT QUOTE
Patient presents to ED from home complaining of abscess on left thumb. Patient states she first noticed redness a few days ago and now there is a visible puss filled abscess. Patient denies fever, chills, n/v/d, dizziness. ISAR POSITIVE

## 2021-11-11 LAB
-  AMIKACIN: SIGNIFICANT CHANGE UP
-  AMOXICILLIN/CLAVULANIC ACID: SIGNIFICANT CHANGE UP
-  AMPICILLIN/SULBACTAM: SIGNIFICANT CHANGE UP
-  AMPICILLIN: SIGNIFICANT CHANGE UP
-  AZTREONAM: SIGNIFICANT CHANGE UP
-  CEFAZOLIN: SIGNIFICANT CHANGE UP
-  CEFEPIME: SIGNIFICANT CHANGE UP
-  CEFOXITIN: SIGNIFICANT CHANGE UP
-  CEFTRIAXONE: SIGNIFICANT CHANGE UP
-  CIPROFLOXACIN: SIGNIFICANT CHANGE UP
-  ERTAPENEM: SIGNIFICANT CHANGE UP
-  GENTAMICIN: SIGNIFICANT CHANGE UP
-  IMIPENEM: SIGNIFICANT CHANGE UP
-  LEVOFLOXACIN: SIGNIFICANT CHANGE UP
-  MEROPENEM: SIGNIFICANT CHANGE UP
-  PIPERACILLIN/TAZOBACTAM: SIGNIFICANT CHANGE UP
-  TOBRAMYCIN: SIGNIFICANT CHANGE UP
-  TRIMETHOPRIM/SULFAMETHOXAZOLE: SIGNIFICANT CHANGE UP
CULTURE RESULTS: SIGNIFICANT CHANGE UP
METHOD TYPE: SIGNIFICANT CHANGE UP
ORGANISM # SPEC MICROSCOPIC CNT: SIGNIFICANT CHANGE UP
ORGANISM # SPEC MICROSCOPIC CNT: SIGNIFICANT CHANGE UP
SPECIMEN SOURCE: SIGNIFICANT CHANGE UP

## 2022-02-22 NOTE — ED ADULT NURSE NOTE - NS PRO PASSIVE SMOKE EXP
Call returned to wife after receiving referral from biopsy procedure from Oncology. Appt given to see Dr. Leola Kerr tomorrow to discuss biopsy needed and evaluation visit.  Pt and wife agreeable to this appt 2/23/22 at 1030am.
No

## 2022-10-10 NOTE — ED ADULT TRIAGE NOTE - NS ED TRIAGE AVPU SCALE
Home Alert-The patient is alert, awake and responds to voice. The patient is oriented to time, place, and person. The triage nurse is able to obtain subjective information.

## 2023-04-03 NOTE — ED PROVIDER NOTE - PMH
steak and eggs
Acute UTI    Basal cell cancer  skin  Diabetes mellitus    Elevated cholesterol    Hypertension    OA (osteoarthritis)    Scoliosis

## 2023-11-12 NOTE — ED CLERICAL - NS ED CLERK UNITS
Patient  notified to do [email or web address] .Yuliet Viveros RN     The patient is Stable - Low risk of patient condition declining or worsening    Shift Goals  Clinical Goals: mobility, pain, drain  Patient Goals: mobility, pain  Family Goals: comfort    Progress made toward(s) clinical / shift goals:    Problem: Pain - Standard  Goal: Alleviation of pain or a reduction in pain to the patient’s comfort goal  Outcome: Progressing     Problem: Fall Risk  Goal: Patient will remain free from falls  Outcome: Progressing     Problem: Knowledge Deficit - Standard  Goal: Patient and family/care givers will demonstrate understanding of plan of care, disease process/condition, diagnostic tests and medications  Outcome: Progressing       Patient is not progressing towards the following goals:       MED 133t 272 @ 1384 529l

## 2024-02-01 NOTE — ED ADULT NURSE NOTE - NS ED NURSE RECORD ANOTHER VITAL SIGN
AMB US Pelvic Non OB    Date/Time: 2/1/2024 9:20 AM    Performed by: Emilia Soares  Authorized by: Jose Leung MD  Universal Protocol:  Consent: Verbal consent obtained.  Consent given by: patient  Patient understanding: patient states understanding of the procedure being performed  Patient identity confirmed: verbally with patient    Procedure details:     SIS Procedure: No    Indications: leiomyoma      Technique:  Transvaginal US, Non-OB    Position: lithotomy exam    Uterine findings:     Length (cm): 5.16    Height (cm):  2.8    Width (cm):  3.96    Endometrial stripe: identified      Endometrium thickness (mm):  1.3  Left ovary findings:     Left ovary:  Visualized    Length (cm): 1.49    Height (cm): 0.88    Width (cm): 1.01  Right ovary findings:     Right ovary:  Visualized    Length (cm): 1.5    Height (cm): 0.88    Width (cm): 0.97  Other findings:     Free pelvic fluid: not identified      Free peritoneal fluid: not identified    Post-Procedure Details:     Impression:  Retroverted uterus demonstrates stable fibroids. Largest are anterior fundal 1.0cm, lower uterine segment 0.9cm, and anterior intramural 1.0cm. Bilateral ovaries appear within normal limits. No free fluid.     Tolerance:  Tolerated well, no immediate complications    Complications: no complications    Additional Procedure Comments:      [employer] F8 E8C-RS transvaginal transducer Serial # [id number] was used to perform the examination today and subsequently followed with high level disinfection utilizing Trophon EPR procedure.     Ultrasound performed at:     [facility]  [address] [place] [address]  Phone:  [phone]  Fax:  [phone]       Yes

## 2024-10-29 NOTE — ED ADULT TRIAGE NOTE - NS ED TRIAGE AVPU SCALE
Alert-The patient is alert, awake and responds to voice. The patient is oriented to time, place, and person. The triage nurse is able to obtain subjective information. [FreeTextEntry1] : pt is here for cpe.

## 2024-12-26 NOTE — ED PROVIDER NOTE - CONSTITUTIONAL APPEARANCE HYGIENE, MLM
Jennifer Morrell was seen 12/26/2024 by Dr. Pulliam and referred for an audiological evaluation. Pt was accompanied by mother during today's visit. Pertinent complaints today include left ear hearing loss, occasional high pitched tinnitus in the left ear, and episodes of vertigo. Otoscopy revealed no cerumen in either ear. The tympanic membrane was visualized AU prior to proceeding with the hearing testing.     Results reveal a hearing within normal limits from 250-8000 Hz for the right ear and  normal-to-mild mixed hearing loss for the left ear.    Speech Reception Thresholds were  10 dBHL for the right ear and 35 dBHL for the left ear.    Word recognition scores were excellent for the right ear and excellent for the left ear.   Tympanograms were Type A for the right ear and Type A for the left ear. Ipsilateral and contralateral acoustic reflexes were completed, bilaterally. Responses were present ipsilaterally when presenting to the right ear, 500-4000 Hz, and absent 500-2000 Hz and present but elevated at 4000 Hz when presented contralaterally to the right ear. Reflexes were absent when stimulus presented to left ear both ipsilaterally and contralaterally.     Audiogram results were reviewed in detail with patient and all questions were answered. Results will be reviewed by the referring provider at the completion of this note. All complaints were addressed during this visit to the patient's satisfaction.    Recommendations:  Follow up with ENT  Repeat audiogram per ENT treatment plan  Hearing protection in noise       well appearing

## 2025-01-22 NOTE — ED ADULT NURSE NOTE - TEMPLATE
Advised mother to schedule f/u appt with psychiatry  Instructed pt and mother on supportive measures such as maintaining routine/structure at home, engage in physical activity, limit time on cell phones/computer/tv   Fall